# Patient Record
Sex: FEMALE | Race: AMERICAN INDIAN OR ALASKA NATIVE | NOT HISPANIC OR LATINO | Employment: UNEMPLOYED
[De-identification: names, ages, dates, MRNs, and addresses within clinical notes are randomized per-mention and may not be internally consistent; named-entity substitution may affect disease eponyms.]

---

## 2018-01-19 RX ORDER — INSULIN PUMP SYRINGE, 3 ML
EACH MISCELLANEOUS
COMMUNITY
Start: 2015-12-21

## 2018-01-19 RX ORDER — HYDROCODONE BITARTRATE AND ACETAMINOPHEN 5; 325 MG/1; MG/1
TABLET ORAL
COMMUNITY
Start: 2015-12-21 | End: 2018-05-22

## 2018-01-19 RX ORDER — NORTRIPTYLINE HCL 10 MG
CAPSULE ORAL
COMMUNITY
Start: 2015-12-21 | End: 2018-05-22

## 2018-01-19 RX ORDER — OXYBUTYNIN CHLORIDE 10 MG/1
20 TABLET, EXTENDED RELEASE ORAL DAILY
COMMUNITY
Start: 2015-12-21

## 2018-01-19 RX ORDER — GABAPENTIN 300 MG/1
CAPSULE ORAL 2 TIMES DAILY
Status: ON HOLD | COMMUNITY
Start: 2016-04-20 | End: 2018-05-23

## 2018-01-19 RX ORDER — GABAPENTIN 600 MG/1
TABLET ORAL
COMMUNITY
Start: 2015-12-21 | End: 2018-05-22 | Stop reason: DRUGHIGH

## 2018-01-19 RX ORDER — METOPROLOL TARTRATE 25 MG/1
TABLET, FILM COATED ORAL
Status: ON HOLD | COMMUNITY
Start: 2015-12-21 | End: 2018-05-23

## 2018-01-19 RX ORDER — PYRIDOXINE HCL (VITAMIN B6) 50 MG
TABLET ORAL
COMMUNITY
Start: 2015-12-21 | End: 2018-05-22

## 2018-03-24 ENCOUNTER — HEALTH MAINTENANCE LETTER (OUTPATIENT)
Age: 48
End: 2018-03-24

## 2018-05-22 ENCOUNTER — HOSPITAL ENCOUNTER (OUTPATIENT)
Facility: OTHER | Age: 48
Setting detail: OBSERVATION
Discharge: NURSING, REHAB OR LONG-TERM CARE | End: 2018-05-26
Attending: FAMILY MEDICINE | Admitting: SURGERY
Payer: COMMERCIAL

## 2018-05-22 ENCOUNTER — APPOINTMENT (OUTPATIENT)
Dept: ULTRASOUND IMAGING | Facility: OTHER | Age: 48
End: 2018-05-22
Attending: FAMILY MEDICINE
Payer: COMMERCIAL

## 2018-05-22 ENCOUNTER — HOSPITAL ENCOUNTER (EMERGENCY)
Facility: OTHER | Age: 48
Discharge: HOME | End: 2018-05-22
Attending: EMERGENCY MEDICINE | Admitting: EMERGENCY MEDICINE
Payer: COMMERCIAL

## 2018-05-22 VITALS
HEART RATE: 109 BPM | DIASTOLIC BLOOD PRESSURE: 61 MMHG | BODY MASS INDEX: 33.99 KG/M2 | HEIGHT: 61 IN | WEIGHT: 180 LBS | SYSTOLIC BLOOD PRESSURE: 103 MMHG | TEMPERATURE: 98.1 F | OXYGEN SATURATION: 98 % | RESPIRATION RATE: 18 BRPM

## 2018-05-22 DIAGNOSIS — N92.0 EXCESSIVE OR FREQUENT MENSTRUATION: ICD-10-CM

## 2018-05-22 DIAGNOSIS — D50.0 IRON DEFICIENCY ANEMIA DUE TO CHRONIC BLOOD LOSS: ICD-10-CM

## 2018-05-22 DIAGNOSIS — R42 DIZZINESS: Primary | ICD-10-CM

## 2018-05-22 DIAGNOSIS — N93.9 VAGINA BLEEDING: ICD-10-CM

## 2018-05-22 DIAGNOSIS — H53.9 VISION CHANGES: ICD-10-CM

## 2018-05-22 LAB
ALBUMIN SERPL-MCNC: 4 G/DL (ref 3.5–5.7)
ALP SERPL-CCNC: 52 U/L (ref 34–104)
ALT SERPL W P-5'-P-CCNC: 26 U/L (ref 7–52)
ANION GAP SERPL CALCULATED.3IONS-SCNC: 9 MMOL/L (ref 3–14)
AST SERPL W P-5'-P-CCNC: 21 U/L (ref 13–39)
BASOPHILS # BLD AUTO: 0 10E9/L (ref 0–0.2)
BASOPHILS # BLD AUTO: 0.1 10E9/L (ref 0–0.2)
BASOPHILS NFR BLD AUTO: 0.6 %
BASOPHILS NFR BLD AUTO: 0.7 %
BILIRUB SERPL-MCNC: 0.2 MG/DL (ref 0.3–1)
BUN SERPL-MCNC: 24 MG/DL (ref 7–25)
CALCIUM SERPL-MCNC: 9.1 MG/DL (ref 8.6–10.3)
CHLORIDE SERPL-SCNC: 110 MMOL/L (ref 98–107)
CO2 SERPL-SCNC: 24 MMOL/L (ref 21–31)
CREAT SERPL-MCNC: 0.73 MG/DL (ref 0.6–1.2)
DIFFERENTIAL METHOD BLD: ABNORMAL
DIFFERENTIAL METHOD BLD: ABNORMAL
EOSINOPHIL # BLD AUTO: 0.1 10E9/L (ref 0–0.7)
EOSINOPHIL # BLD AUTO: 0.1 10E9/L (ref 0–0.7)
EOSINOPHIL NFR BLD AUTO: 1 %
EOSINOPHIL NFR BLD AUTO: 1.8 %
ERYTHROCYTE [DISTWIDTH] IN BLOOD BY AUTOMATED COUNT: 15 % (ref 10–15)
ERYTHROCYTE [DISTWIDTH] IN BLOOD BY AUTOMATED COUNT: 15 % (ref 10–15)
GFR SERPL CREATININE-BSD FRML MDRD: 85 ML/MIN/1.7M2
GLUCOSE SERPL-MCNC: 121 MG/DL (ref 70–105)
HCG SERPL QL: NEGATIVE
HCT VFR BLD AUTO: 28.8 % (ref 35–47)
HCT VFR BLD AUTO: 31.9 % (ref 35–47)
HGB BLD-MCNC: 10.6 G/DL (ref 11.7–15.7)
HGB BLD-MCNC: 9.3 G/DL (ref 11.7–15.7)
IMM GRANULOCYTES # BLD: 0 10E9/L (ref 0–0.4)
IMM GRANULOCYTES # BLD: 0 10E9/L (ref 0–0.4)
IMM GRANULOCYTES NFR BLD: 0.1 %
IMM GRANULOCYTES NFR BLD: 0.1 %
INR PPP: 0.93 (ref 0–1.3)
LYMPHOCYTES # BLD AUTO: 2.4 10E9/L (ref 0.8–5.3)
LYMPHOCYTES # BLD AUTO: 3 10E9/L (ref 0.8–5.3)
LYMPHOCYTES NFR BLD AUTO: 35.2 %
LYMPHOCYTES NFR BLD AUTO: 44 %
MCH RBC QN AUTO: 28.2 PG (ref 26.5–33)
MCH RBC QN AUTO: 28.3 PG (ref 26.5–33)
MCHC RBC AUTO-ENTMCNC: 32.3 G/DL (ref 31.5–36.5)
MCHC RBC AUTO-ENTMCNC: 33.2 G/DL (ref 31.5–36.5)
MCV RBC AUTO: 85 FL (ref 78–100)
MCV RBC AUTO: 87 FL (ref 78–100)
MONOCYTES # BLD AUTO: 0.5 10E9/L (ref 0–1.3)
MONOCYTES # BLD AUTO: 0.5 10E9/L (ref 0–1.3)
MONOCYTES NFR BLD AUTO: 7.6 %
MONOCYTES NFR BLD AUTO: 7.9 %
NEUTROPHILS # BLD AUTO: 3.1 10E9/L (ref 1.6–8.3)
NEUTROPHILS # BLD AUTO: 3.8 10E9/L (ref 1.6–8.3)
NEUTROPHILS NFR BLD AUTO: 45.6 %
NEUTROPHILS NFR BLD AUTO: 55.4 %
PLATELET # BLD AUTO: 180 10E9/L (ref 150–450)
PLATELET # BLD AUTO: 210 10E9/L (ref 150–450)
POTASSIUM SERPL-SCNC: 4.3 MMOL/L (ref 3.5–5.1)
PROT SERPL-MCNC: 6.7 G/DL (ref 6.4–8.9)
RBC # BLD AUTO: 3.3 10E12/L (ref 3.8–5.2)
RBC # BLD AUTO: 3.74 10E12/L (ref 3.8–5.2)
SODIUM SERPL-SCNC: 143 MMOL/L (ref 134–144)
WBC # BLD AUTO: 6.7 10E9/L (ref 4–11)
WBC # BLD AUTO: 6.9 10E9/L (ref 4–11)

## 2018-05-22 PROCEDURE — 86901 BLOOD TYPING SEROLOGIC RH(D): CPT | Performed by: FAMILY MEDICINE

## 2018-05-22 PROCEDURE — 84703 CHORIONIC GONADOTROPIN ASSAY: CPT | Performed by: FAMILY MEDICINE

## 2018-05-22 PROCEDURE — 85025 COMPLETE CBC W/AUTO DIFF WBC: CPT | Performed by: EMERGENCY MEDICINE

## 2018-05-22 PROCEDURE — 80053 COMPREHEN METABOLIC PANEL: CPT | Performed by: EMERGENCY MEDICINE

## 2018-05-22 PROCEDURE — 86850 RBC ANTIBODY SCREEN: CPT | Performed by: FAMILY MEDICINE

## 2018-05-22 PROCEDURE — 36415 COLL VENOUS BLD VENIPUNCTURE: CPT | Performed by: FAMILY MEDICINE

## 2018-05-22 PROCEDURE — 86900 BLOOD TYPING SEROLOGIC ABO: CPT | Performed by: FAMILY MEDICINE

## 2018-05-22 PROCEDURE — 25000128 H RX IP 250 OP 636: Performed by: EMERGENCY MEDICINE

## 2018-05-22 PROCEDURE — 36415 COLL VENOUS BLD VENIPUNCTURE: CPT | Performed by: EMERGENCY MEDICINE

## 2018-05-22 PROCEDURE — 85610 PROTHROMBIN TIME: CPT | Performed by: FAMILY MEDICINE

## 2018-05-22 PROCEDURE — 96361 HYDRATE IV INFUSION ADD-ON: CPT | Performed by: EMERGENCY MEDICINE

## 2018-05-22 PROCEDURE — 99284 EMERGENCY DEPT VISIT MOD MDM: CPT | Mod: 25 | Performed by: EMERGENCY MEDICINE

## 2018-05-22 PROCEDURE — 96374 THER/PROPH/DIAG INJ IV PUSH: CPT | Performed by: EMERGENCY MEDICINE

## 2018-05-22 PROCEDURE — 76830 TRANSVAGINAL US NON-OB: CPT | Mod: TC

## 2018-05-22 PROCEDURE — 86920 COMPATIBILITY TEST SPIN: CPT | Performed by: FAMILY MEDICINE

## 2018-05-22 PROCEDURE — 85025 COMPLETE CBC W/AUTO DIFF WBC: CPT | Performed by: FAMILY MEDICINE

## 2018-05-22 PROCEDURE — 99284 EMERGENCY DEPT VISIT MOD MDM: CPT | Mod: Z6 | Performed by: EMERGENCY MEDICINE

## 2018-05-22 RX ORDER — KETOROLAC TROMETHAMINE 30 MG/ML
30 INJECTION, SOLUTION INTRAMUSCULAR; INTRAVENOUS ONCE
Status: COMPLETED | OUTPATIENT
Start: 2018-05-22 | End: 2018-05-22

## 2018-05-22 RX ORDER — SODIUM CHLORIDE 9 MG/ML
INJECTION, SOLUTION INTRAVENOUS CONTINUOUS
Status: DISCONTINUED | OUTPATIENT
Start: 2018-05-22 | End: 2018-05-22 | Stop reason: HOSPADM

## 2018-05-22 RX ADMIN — SODIUM CHLORIDE: 900 INJECTION, SOLUTION INTRAVENOUS at 14:03

## 2018-05-22 RX ADMIN — KETOROLAC TROMETHAMINE 30 MG: 30 INJECTION, SOLUTION INTRAMUSCULAR at 14:22

## 2018-05-22 ASSESSMENT — ENCOUNTER SYMPTOMS
SHORTNESS OF BREATH: 0
LIGHT-HEADEDNESS: 1
VOMITING: 0
CHILLS: 0
NAUSEA: 0
CHILLS: 0
LIGHT-HEADEDNESS: 1
DIFFICULTY URINATING: 1
ARTHRALGIAS: 0
EYES NEGATIVE: 1
AGITATION: 0
FATIGUE: 1
ABDOMINAL PAIN: 1
FEVER: 0
CHEST TIGHTNESS: 0
DYSURIA: 0
COUGH: 1
FEVER: 0

## 2018-05-22 NOTE — ED PROVIDER NOTES
"  History     Chief Complaint   Patient presents with     Vaginal Bleeding     Patient is a 47 year old female presenting with vaginal discharge. The history is provided by the patient.   Vaginal Problem   Associated symptoms: no dysuria, no fever, no nausea and no vomiting      My Lauren is a 47 year old female who is here complaining of vaginal bleeding. She states she has been having bleeding for the last 4 days. Last night it got much heavier and more painful. She says she has been passing large clots, one is large as her hand. She normally does not have pain with her periods but this pain is suprapubic and she rates it as a constant 7 out of 10. She has been feeling dizzy. Today she got up quickly and actually fell flat on her face. She does not believe she actually lost consciousness. Patient recently had an oophorectomy. At that time they did a D&C as well. When I asked if she has a history of fibroids she does not know that word. She has in the past been given \"hormones\" for heavy bleeding.    Problem List:    Patient Active Problem List    Diagnosis Date Noted     Trochanteric bursitis of both hips 12/27/2012     Priority: Medium     Other, mixed, or unspecified nondependent drug abuse, unspecified 07/25/2012     Priority: Medium     Overview:   Cocaine, alcohol. Completed treatment 2010.       Constipation 05/18/2012     Priority: Medium     Irregular menses 11/29/2011     Priority: Medium     Other acute reactions to stress 09/27/2011     Priority: Medium     Esophageal reflux 06/07/2011     Priority: Medium     Hypertension 06/07/2011     Priority: Medium     Insomnia 06/07/2011     Priority: Medium     Lumbago 06/07/2011     Priority: Medium     Posttraumatic stress disorder 06/07/2011     Priority: Medium     Rheumatoid arthritis (H) 06/07/2011     Priority: Medium        Past Medical History:    Past Medical History:   Diagnosis Date     Rheumatoid arthritis (H)        Past Surgical History:  " "  Past Surgical History:   Procedure Laterality Date     CHOLECYSTECTOMY      ,[facility]     LAPAROSCOPIC TUBAL LIGATION      ,[facility]     OTHER SURGICAL HISTORY      2/10,684371,OTHER,Rectal bleeding, hospitalized [facility]       Family History:    Family History   Problem Relation Age of Onset     Arthritis Mother      Arthritis,RA     DIABETES Mother      Diabetes,bordeline     CANCER Mother      Cancer,stomach tumor     Genitourinary Problems Mother      Genitourinary Disease,\"bad\" bladder     Other - See Comments Mother      hysterectomy in 30's     Other - See Comments Father           CANCER Brother      Cancer, liver CA age 55     Substance Abuse Brother      Alcohol/Drug,hx alcohol abuse     Arthritis Brother      Arthritis     Other - See Comments Brother      scoliosis     Family History Negative Sister      Good Health     Ovarian Cancer Sister 46     Cancer-ovarian     Family History Negative Sister      Good Health     DIABETES Paternal Grandmother      Diabetes     DIABETES Maternal Grandmother      Diabetes     Blood Disease Son      Blood Disease,HIV     Other - See Comments Other      Multiple incidents of suicide and death by homicide in her family.     Colon Cancer Paternal Grandfather      Cancer-colon     Breast Cancer Maternal Aunt 52     Cancer-breast     Prostate Cancer No family hx of      Cancer-prostate       Social History:  Marital Status:  Single [1]  Social History   Substance Use Topics     Smoking status: Current Every Day Smoker     Packs/day: 0.50     Years: 30.00     Types: Cigarettes     Last attempt to quit: 2012     Smokeless tobacco: Never Used     Alcohol use No      Comment: Alcoholic Drinks/day: Twice weekly        Medications:      ASPIRIN PO   Atorvastatin Calcium (LIPITOR PO)   gabapentin (NEURONTIN) 300 MG capsule   metFORMIN (GLUCOPHAGE) 500 MG tablet   metoprolol tartrate (LOPRESSOR) 25 MG tablet   oxybutynin (DITROPAN-XL) 10 MG 24 hr " "tablet   RaNITidine HCl (ZANTAC PO)   Blood Glucose Monitoring Suppl (FIFTY50 GLUCOSE METER 2.0) W/DEVICE KIT   [DISCONTINUED] gabapentin (NEURONTIN) 600 MG tablet         Review of Systems   Constitutional: Negative for chills and fever.   HENT: Negative for congestion.    Eyes: Negative for visual disturbance.   Respiratory: Negative for chest tightness and shortness of breath.    Cardiovascular: Negative for chest pain.   Gastrointestinal: Negative for nausea and vomiting.   Genitourinary: Positive for pelvic pain and vaginal bleeding. Negative for dysuria.   Musculoskeletal: Negative for arthralgias.   Skin: Negative for rash.   Neurological: Positive for light-headedness.   Psychiatric/Behavioral: Negative for agitation.       Physical Exam   BP: 134/81  Pulse: 109  Temp: 98.1  F (36.7  C)  Resp: 18  Height: 154.9 cm (5' 1\")  Weight: 81.6 kg (180 lb)  SpO2: 97 %  Lying Orthostatic BP: 128/74  Lying Orthostatic Pulse: 100 bpm  Sitting Orthostatic BP: 123/83  Sitting Orthostatic Pulse: 106 bpm  Standing Orthostatic BP: 124/91  Standing Orthostatic Pulse: 117 bpm      Physical Exam   Constitutional: She appears well-developed and well-nourished. No distress.   HENT:   Head: Normocephalic and atraumatic.   Eyes: Conjunctivae are normal.   Neck: Neck supple.   Cardiovascular: Normal rate, regular rhythm and normal heart sounds.    Pulmonary/Chest: Effort normal and breath sounds normal.   Abdominal: Soft. Bowel sounds are normal. She exhibits no distension. There is tenderness in the suprapubic area.   Diffusely tender everywhere, but much more so in the suprapubic area.   Skin: Skin is warm and dry. She is not diaphoretic.   Psychiatric: She has a normal mood and affect. Her behavior is normal.   Nursing note and vitals reviewed.      ED Course     ED Course     Procedures           Results for orders placed or performed during the hospital encounter of 05/22/18 (from the past 24 hour(s))   CBC with platelets " differential   Result Value Ref Range    WBC 6.9 4.0 - 11.0 10e9/L    RBC Count 3.74 (L) 3.8 - 5.2 10e12/L    Hemoglobin 10.6 (L) 11.7 - 15.7 g/dL    Hematocrit 31.9 (L) 35.0 - 47.0 %    MCV 85 78 - 100 fl    MCH 28.3 26.5 - 33.0 pg    MCHC 33.2 31.5 - 36.5 g/dL    RDW 15.0 10.0 - 15.0 %    Platelet Count 210 150 - 450 10e9/L    Diff Method Automated Method     % Neutrophils 55.4 %    % Lymphocytes 35.2 %    % Monocytes 7.6 %    % Eosinophils 1.0 %    % Basophils 0.7 %    % Immature Granulocytes 0.1 %    Absolute Neutrophil 3.8 1.6 - 8.3 10e9/L    Absolute Lymphocytes 2.4 0.8 - 5.3 10e9/L    Absolute Monocytes 0.5 0.0 - 1.3 10e9/L    Absolute Eosinophils 0.1 0.0 - 0.7 10e9/L    Absolute Basophils 0.1 0.0 - 0.2 10e9/L    Abs Immature Granulocytes 0.0 0 - 0.4 10e9/L   Comprehensive metabolic panel   Result Value Ref Range    Sodium 143 134 - 144 mmol/L    Potassium 4.3 3.5 - 5.1 mmol/L    Chloride 110 (H) 98 - 107 mmol/L    Carbon Dioxide 24 21 - 31 mmol/L    Anion Gap 9 3 - 14 mmol/L    Glucose 121 (H) 70 - 105 mg/dL    Urea Nitrogen 24 7 - 25 mg/dL    Creatinine 0.73 0.60 - 1.20 mg/dL    GFR Estimate 85 >60 mL/min/1.7m2    GFR Estimate If Black >90 >60 mL/min/1.7m2    Calcium 9.1 8.6 - 10.3 mg/dL    Bilirubin Total 0.2 (L) 0.3 - 1.0 mg/dL    Albumin 4.0 3.5 - 5.7 g/dL    Protein Total 6.7 6.4 - 8.9 g/dL    Alkaline Phosphatase 52 34 - 104 U/L    ALT 26 7 - 52 U/L    AST 21 13 - 39 U/L       Medications   sodium chloride 0.9% infusion ( Intravenous Stopped 5/22/18 8686)   ketorolac (TORADOL) injection 30 mg (30 mg Intravenous Given 5/22/18 1422)       Assessments & Plan (with Medical Decision Making)     I have reviewed the nursing notes.    I have reviewed the findings, diagnosis, plan and need for follow up with the patient.  Patient's labs are reassuring. After a liter of fluids her orthostatic vitals were improved. Her heart rate had gone up to 128 with standing initially, much better at this time. Resting heart  rate is in the 80s. She is feeling better as well. She also feels that the bleeding has slowed down significantly. I think we are safe to let her go home at this time, however if her bleeding returns she can always come back for a recheck. I also suggested following up with her OB/GYN to discuss the unusually heavy bleeding.    New Prescriptions    No medications on file       Final diagnoses:   Vagina bleeding       5/22/2018   [facility]     Bishnu Sanchez MD  05/22/18 0867

## 2018-05-22 NOTE — ED TRIAGE NOTES
Bleeding started 4 days ago. Last night had what felt like a after birth come out.  Patient states that it was a huge clot.  Still having clots, felt dizzy and near syncope this am. Partial hysterectomy done at [facility] on 02/04/18. Uterus, left ovary and tube is still in place. Has soaked 22 pads since yesterday night.  Patient brought back to room to be evaluated

## 2018-05-22 NOTE — ED NOTES
COLUMBIA-SUICIDE SEVERITY RATING SCALE   Screen with Triage Points for Emergency Department      Ask questions that are bolded and underlined.   Past  month   Ask Questions 1 and 2 YES NO   1)  Have you wished you were dead or wished you could go to sleep and not wake up?   x   2)  Have you actually had any thoughts of killing yourself?   x   If YES to 2, ask questions 3, 4, 5, and 6.  If NO to 2, go directly to question 6.   3)  Have you been thinking about how you might do this?   E.g.  I thought about taking an overdose but I never made a specific plan as to when where or how I would actually do it .and I would never go through with it.       4)  Have you had these thoughts and had some intention of acting on them?   As opposed to  I have the thoughts but I definitely will not do anything about them.       5)  Have you started to work out or worked out the details of how to kill yourself? Do you intend to carry out this plan?      6)  Have you ever done anything, started to do anything, or prepared to do anything to end your life?  Examples: Collected pills, obtained a gun, gave away valuables, wrote a will or suicide note, took out pills but didn t swallow any, held a gun but changed your mind or it was grabbed from your hand, went to the roof but didn t jump; or actually took pills, tried to shoot yourself, cut yourself, tried to hang yourself, etc.    If YES, ask: Was this within the past three months?  Lifetime     x    Past 3 Months        Item 1:  Behavioral Health Referral at Discharge  Item 2:  Behavioral Health Referral at Discharge   Item 3:  Behavioral Health Consult (Psychiatric Nurse/) and consider Patient Safety Precautions  Item 4:  Immediate Notification of Physician and/or Behavioral Health and Patient Safety Precautions   Item 5:  Immediate Notification of Physician and/or Behavioral Health and Patient Safety Precautions  Item 6:  Over 3 months ago: Behavioral Health Consult  (Psychiatric Nurse/) and consider Patient Safety Precautions  OR  Item 6:  3 months ago or less: Immediate Notification of Physician and/or Behavioral Health and Patient Safety Precautions

## 2018-05-22 NOTE — ED AVS SNAPSHOT
[facility]    [address]    [place] [address]    Phone:  [phone]    Fax:  [phone]                                       My Lauren   MRN: [id number]    Department:  [facility]   Date of Visit:  5/22/2018           After Visit Summary Signature Page     I have received my discharge instructions, and my questions have been answered. I have discussed any challenges I see with this plan with the nurse or doctor.    ..........................................................................................................................................  Patient/Patient Representative Signature      ..........................................................................................................................................  Patient Representative Print Name and Relationship to Patient    ..................................................               ................................................  Date                                            Time    ..........................................................................................................................................  Reviewed by Signature/Title    ...................................................              ..............................................  Date                                                            Time

## 2018-05-22 NOTE — IP AVS SNAPSHOT
MRN:[id number]                      After Visit Summary   5/22/2018    My Lauren    MRN: [id number]           Thank you!     Thank you for choosing [facility] for your care. Our goal is always to provide you with excellent care. Hearing back from our patients is one way we can continue to improve our services. Please take a few minutes to complete the written survey that you may receive in the mail after you visit with us. Thank you!        Patient Information     Date Of Birth          1970        Designated Caregiver       Most Recent Value    Caregiver    Will someone help with your care after discharge? no      About your hospital stay     You were admitted on:  May 23, 2018 You last received care in the:  [facility]    You were discharged on:  May 26, 2018       Who to Call     For medical emergencies, please call 911.  For non-urgent questions about your medical care, please call your primary care provider or clinic, [phone]          Attending Provider     Provider Specialty    [facility]    Ced Jansen MD [facility]       Primary Care Provider Office Phone # Fax #    Reji Correa [phone]      Your next 10 appointments already scheduled     Jun 01, 2018  1:15 PM CDT   [facility]   [facility]    [address]  [place] [address] [phone]           Bring a current list of meds and any records pertaining to this visit. For Physicals, please bring immunization records and any forms needing to be filled out. Please arrive 10 minutes early to complete paperwork.            Jun 05, 2018  9:45 AM CDT   [facility] Visit with Analy Robledo MD   [facility]    [address]  [place] [address]  "  [phone]           Bring a current list of meds and any records pertaining to this visit. For Physicals, please bring immunization records and any forms needing to be filled out. Please arrive 10 minutes early to complete paperwork.              Pending Results     Date and Time Order Name Status Description    5/26/2018 1334 *UA reflex to Microscopic In process     5/26/2018 1155 Urine Culture Aerobic Bacterial In process             Statement of Approval     Ordered          05/26/18 1213  I have reviewed and agree with all the recommendations and orders detailed in this document.  EFFECTIVE NOW     Approved and electronically signed by:  Ced Jansen MD             Admission Information     Date & Time Provider Department Dept. Phone    5/22/2018 Ced Jansen MD [facility] [phone]      Your Vitals Were     Blood Pressure Temperature Respirations Height Weight Pulse Oximetry    114/79 97  F (36.1  C) (Oral) 16 1.549 m (5' 1\") 86.8 kg (191 lb 5.8 oz) 97%    BMI (Body Mass Index)                   36.16 kg/m2           Care EveryWhere ID     This is your Care EveryWhere ID. This could be used by other organizations to access your [facility] medical records  [id number]        Equal Access to Services     [facility] WHIT AH: Hadii davis ho Soholli, waaxda luqadaha, qaybta kaalmada adeegyada, momo downs. So [facility] [phone].    ATENCIÓN: Si habla español, tiene a clarke disposición servicios gratuitos de asistencia lingüística. Levon al [phone].    We comply with applicable federal civil rights laws and [place] laws. We do not discriminate on the basis of race, color, national origin, age, disability, sex, sexual orientation, or gender identity.               Review of your medicines      UNREVIEWED medicines. Ask your doctor about these medicines        Dose / Directions    aspirin 81 MG tablet        Dose:  81 mg   Take 81 mg by mouth daily "   Refills:  0       atorvastatin 40 MG tablet   Commonly known as:  LIPITOR        Dose:  40 mg   Take 40 mg by mouth daily   Refills:  0       gabapentin 600 MG tablet   Commonly known as:  NEURONTIN        Dose:  600 mg   Take 600 mg by mouth 2 times daily   Refills:  0       metFORMIN 500 MG tablet   Commonly known as:  GLUCOPHAGE        Take 1 tablet by mouth 2 times daily with meals.   Refills:  0       metoprolol succinate 25 MG 24 hr tablet   Commonly known as:  TOPROL-XL        Dose:  25 mg   Take 25 mg by mouth daily   Refills:  0       oxybutynin 10 MG 24 hr tablet   Commonly known as:  DITROPAN-XL        Take 1 tablet by mouth once daily.   Refills:  0       ranitidine 150 MG tablet   Commonly known as:  ZANTAC        Dose:  150 mg   Take 150 mg by mouth daily   Refills:  0         START taking        Dose / Directions    medroxyPROGESTERone 10 MG tablet   Commonly known as:  PROVERA        Dose:  10 mg   Take 1 tablet (10 mg) by mouth daily   Quantity:  14 tablet   Refills:  0         CONTINUE these medicines which have NOT CHANGED        Dose / Directions    FIFTY50 GLUCOSE METER 2.0 w/Device Kit        Dispense meter, test strips, lancets covered by pt ins. .00   Refills:  0            Where to get your medicines      These medications were sent to [facility] [address] [place], [place] - [address] [facility] [address]     Phone:  [phone]     medroxyPROGESTERone 10 MG tablet                Protect others around you: Learn how to safely use, store and throw away your medicines at www.disposemymeds.org.             Medication List: This is a list of all your medications and when to take them. Check marks below indicate your daily home schedule. Keep this list as a reference.      Medications           Morning Afternoon Evening Bedtime As Needed    aspirin 81 MG tablet   Take 81 mg by mouth daily                                 atorvastatin 40 MG tablet   Commonly known as:  LIPITOR   Take 40 mg by mouth daily                                FIFTY50 GLUCOSE METER 2.0 w/Device Kit   Dispense meter, test strips, lancets covered by pt ins. .00                                gabapentin 600 MG tablet   Commonly known as:  NEURONTIN   Take 600 mg by mouth 2 times daily                                medroxyPROGESTERone 10 MG tablet   Commonly known as:  PROVERA   Take 1 tablet (10 mg) by mouth daily   Last time this was given:  10 mg on 5/26/2018 11:17 AM                                metFORMIN 500 MG tablet   Commonly known as:  GLUCOPHAGE   Take 1 tablet by mouth 2 times daily with meals.   Last time this was given:  500 mg on 5/26/2018  7:38 AM                                metoprolol succinate 25 MG 24 hr tablet   Commonly known as:  TOPROL-XL   Take 25 mg by mouth daily   Last time this was given:  25 mg on 5/26/2018 11:11 AM                                oxybutynin 10 MG 24 hr tablet   Commonly known as:  DITROPAN-XL   Take 1 tablet by mouth once daily.                                ranitidine 150 MG tablet   Commonly known as:  ZANTAC   Take 150 mg by mouth daily   Last time this was given:  150 mg on 5/26/2018 11:12 AM                                          More Information        Anemia  Anemia is a condition that occurs when your body does not have enough healthy red blood cells (RBCs). RBCs are the parts of your blood that carry oxygen throughout your body. A protein called hemoglobin allows your RBCs to absorb and release oxygen. Without enough RBCs or hemoglobin, your body doesn't get enough oxygen. Symptoms of anemia may then occur.    What are the symptoms of anemia?  Some people with anemia have no symptoms. But most people have symptoms that range from mild to severe. These can include:    Tiredness (fatigue)    Weakness    Pale skin    Shortness of breath    Dizziness or fainting    Rapid heartbeat    Trouble doing  normal amounts of activity    Jaundice (yellowing of your eyes, skin, or mouth; dark urine)  What causes anemia?  Anemia can occur when your body:    Loses too much blood    Does not make enough RBCs    Destroys your RBCs at a faster rate than it can replace them    Does not make a normal amount of hemoglobin in your RBCs  These problems can occur for many reasons, including:    A condition that you are born with (congenital or inherited), such as sickle cell disease or thalassemia    Heavy bleeding for any reason, including injury, surgery, childbirth, or even heavy menstrual periods    Being low in certain nutrients, such as iron, folate, or vitamin B12, possibly from a poor diet or a condition like celiac disease or Crohn's disease    Certain chronic conditions like diabetes, arthritis, or kidney disease    Certain chronic infections like tuberculosis or HIV    Exposure to certain medicines, such as those used for chemotherapy  There are different types of anemia. Your healthcare provider can tell you more about the type of anemia you have and what may have caused it.  How is anemia diagnosed?  To diagnose anemia, your healthcare provider orders blood tests. These can include:    Complete blood cell count (CBC). This test measures the amounts of the different types of blood cells.    Blood smear. This test checks the size and shape of your blood cells. To do the test, a drop of your blood is viewed under a microscope. A stain is used to make the blood cells easier to see.    Iron studies. These tests measure the amount of iron in your blood. Your body needs iron to make hemoglobin in your RBCs.    Vitamin B12 and folate studies. These tests check for some of the components that help give RBCs a normal size and shape.    Reticulocyte count. This test measures the amount of new RBCs that your bone marrow makes.    Hemoglobin electrophoresis. This test checks for problems with your hemoglobin in RBCs.  How is anemia  treated?  Treatment for anemia is based on the type of anemia, its cause, and the severity of your symptoms. Treatments may include:    Diet changes. This involves increasing the amount of certain nutrients in your diet, such as iron, vitamin B12, or folate. Your healthcare provider may also prescribe nutrient supplements.    Medicines. Certain medicines treat the cause of your anemia. Others help build new RBCs or relieve symptoms. If a medicine is the cause of your anemia, you may need to stop or change it.    Blood transfusions. Replacing some of your blood can increase the number of healthy RBCs in your body.    Surgery. In some cases, your doctor may do surgery to treat the underlying cause of anemia. If you need surgery, your healthcare provider will explain the procedure and outline the risks and benefits for you.  What are the long-term concerns?  If you have a certain type of anemia, you can expect a full recovery after treatment. If you have other types of anemia (especially a type you're born with), you will need to manage it for life. Your doctor can tell you more.  Date Last Reviewed: 12/1/2016 2000-2017 [employer]. [address] [place] [address], [place] [address]. All rights reserved. This information is not intended as a substitute for professional medical care. Always follow your healthcare professional's instructions.

## 2018-05-22 NOTE — IP AVS SNAPSHOT
[facility]    [address]    [place] [address]    Phone:  [phone]    Fax:  [phone]                                       After Visit Summary   5/22/2018    My Lauren    MRN: [id number]           After Visit Summary Signature Page     I have received my discharge instructions, and my questions have been answered. I have discussed any challenges I see with this plan with the nurse or doctor.    ..........................................................................................................................................  Patient/Patient Representative Signature      ..........................................................................................................................................  Patient Representative Print Name and Relationship to Patient    ..................................................               ................................................  Date                                            Time    ..........................................................................................................................................  Reviewed by Signature/Title    ...................................................              ..............................................  Date                                                            Time

## 2018-05-23 PROBLEM — N92.0 EXCESSIVE OR FREQUENT MENSTRUATION: Status: ACTIVE | Noted: 2018-05-23

## 2018-05-23 LAB
BLD PROD TYP BPU: NORMAL
BLD UNIT ID BPU: 0
BLOOD PRODUCT CODE: NORMAL
BPU ID: NORMAL
HGB BLD-MCNC: 8.8 G/DL (ref 11.7–15.7)
HGB BLD-MCNC: 9.1 G/DL (ref 11.7–15.7)
TRANSFUSION STATUS PATIENT QL: NORMAL
TRANSFUSION STATUS PATIENT QL: NORMAL

## 2018-05-23 PROCEDURE — 36430 TRANSFUSION BLD/BLD COMPNT: CPT

## 2018-05-23 PROCEDURE — 85018 HEMOGLOBIN: CPT | Mod: 91 | Performed by: OBSTETRICS & GYNECOLOGY

## 2018-05-23 PROCEDURE — 99218 ZZC INITIAL OBSERVATION CARE,LEVL I: CPT | Performed by: OBSTETRICS & GYNECOLOGY

## 2018-05-23 PROCEDURE — 25000125 ZZHC RX 250: Performed by: FAMILY MEDICINE

## 2018-05-23 PROCEDURE — P9016 RBC LEUKOCYTES REDUCED: HCPCS | Performed by: FAMILY MEDICINE

## 2018-05-23 PROCEDURE — 96374 THER/PROPH/DIAG INJ IV PUSH: CPT | Performed by: FAMILY MEDICINE

## 2018-05-23 PROCEDURE — G0378 HOSPITAL OBSERVATION PER HR: HCPCS

## 2018-05-23 PROCEDURE — 25000132 ZZH RX MED GY IP 250 OP 250 PS 637: Performed by: FAMILY MEDICINE

## 2018-05-23 PROCEDURE — 93005 ELECTROCARDIOGRAM TRACING: CPT | Performed by: FAMILY MEDICINE

## 2018-05-23 PROCEDURE — 25000132 ZZH RX MED GY IP 250 OP 250 PS 637: Performed by: OBSTETRICS & GYNECOLOGY

## 2018-05-23 PROCEDURE — 25000128 H RX IP 250 OP 636: Performed by: FAMILY MEDICINE

## 2018-05-23 PROCEDURE — 93010 ELECTROCARDIOGRAM REPORT: CPT | Performed by: INTERNAL MEDICINE

## 2018-05-23 PROCEDURE — 85018 HEMOGLOBIN: CPT | Performed by: FAMILY MEDICINE

## 2018-05-23 PROCEDURE — 99285 EMERGENCY DEPT VISIT HI MDM: CPT | Mod: 25 | Performed by: FAMILY MEDICINE

## 2018-05-23 PROCEDURE — 36415 COLL VENOUS BLD VENIPUNCTURE: CPT | Performed by: OBSTETRICS & GYNECOLOGY

## 2018-05-23 PROCEDURE — 36415 COLL VENOUS BLD VENIPUNCTURE: CPT | Performed by: FAMILY MEDICINE

## 2018-05-23 PROCEDURE — 99285 EMERGENCY DEPT VISIT HI MDM: CPT | Mod: Z6 | Performed by: FAMILY MEDICINE

## 2018-05-23 RX ORDER — NALOXONE HYDROCHLORIDE 0.4 MG/ML
.1-.4 INJECTION, SOLUTION INTRAMUSCULAR; INTRAVENOUS; SUBCUTANEOUS
Status: DISCONTINUED | OUTPATIENT
Start: 2018-05-23 | End: 2018-05-26 | Stop reason: HOSPADM

## 2018-05-23 RX ORDER — METOPROLOL SUCCINATE 25 MG/1
25 TABLET, EXTENDED RELEASE ORAL DAILY
Status: DISCONTINUED | OUTPATIENT
Start: 2018-05-23 | End: 2018-05-26 | Stop reason: HOSPADM

## 2018-05-23 RX ORDER — GABAPENTIN 600 MG/1
600 TABLET ORAL 2 TIMES DAILY
Status: ON HOLD | COMMUNITY
Start: 2018-05-21 | End: 2020-12-17

## 2018-05-23 RX ORDER — ALUMINA, MAGNESIA, AND SIMETHICONE 2400; 2400; 240 MG/30ML; MG/30ML; MG/30ML
15 SUSPENSION ORAL ONCE
Status: COMPLETED | OUTPATIENT
Start: 2018-05-23 | End: 2018-05-23

## 2018-05-23 RX ORDER — FERROUS SULFATE 325(65) MG
325 TABLET, DELAYED RELEASE (ENTERIC COATED) ORAL DAILY
Status: DISCONTINUED | OUTPATIENT
Start: 2018-05-23 | End: 2018-05-26 | Stop reason: HOSPADM

## 2018-05-23 RX ORDER — PROCHLORPERAZINE MALEATE 10 MG
10 TABLET ORAL EVERY 6 HOURS PRN
Status: DISCONTINUED | OUTPATIENT
Start: 2018-05-23 | End: 2018-05-26 | Stop reason: HOSPADM

## 2018-05-23 RX ORDER — LIDOCAINE 40 MG/G
CREAM TOPICAL
Status: DISCONTINUED | OUTPATIENT
Start: 2018-05-23 | End: 2018-05-26 | Stop reason: HOSPADM

## 2018-05-23 RX ORDER — GABAPENTIN 300 MG/1
600 CAPSULE ORAL 3 TIMES DAILY
Status: DISCONTINUED | OUTPATIENT
Start: 2018-05-23 | End: 2018-05-26 | Stop reason: HOSPADM

## 2018-05-23 RX ORDER — ATORVASTATIN CALCIUM 40 MG/1
40 TABLET, FILM COATED ORAL DAILY
COMMUNITY
Start: 2018-01-26

## 2018-05-23 RX ORDER — ONDANSETRON 2 MG/ML
4 INJECTION INTRAMUSCULAR; INTRAVENOUS EVERY 6 HOURS PRN
Status: DISCONTINUED | OUTPATIENT
Start: 2018-05-23 | End: 2018-05-26 | Stop reason: HOSPADM

## 2018-05-23 RX ORDER — METOPROLOL TARTRATE 25 MG/1
25 TABLET, FILM COATED ORAL DAILY
Status: DISCONTINUED | OUTPATIENT
Start: 2018-05-23 | End: 2018-05-23

## 2018-05-23 RX ORDER — METOPROLOL SUCCINATE 25 MG/1
25 TABLET, EXTENDED RELEASE ORAL DAILY
Status: ON HOLD | COMMUNITY
Start: 2018-01-26 | End: 2020-12-17

## 2018-05-23 RX ORDER — ACETAMINOPHEN 325 MG/1
650 TABLET ORAL EVERY 4 HOURS PRN
Status: DISCONTINUED | OUTPATIENT
Start: 2018-05-23 | End: 2018-05-26 | Stop reason: HOSPADM

## 2018-05-23 RX ORDER — MEDROXYPROGESTERONE ACETATE 2.5 MG/1
7.5 TABLET ORAL ONCE
Status: COMPLETED | OUTPATIENT
Start: 2018-05-23 | End: 2018-05-23

## 2018-05-23 RX ORDER — ALUMINA, MAGNESIA, AND SIMETHICONE 2400; 2400; 240 MG/30ML; MG/30ML; MG/30ML
30 SUSPENSION ORAL 4 TIMES DAILY PRN
Status: DISCONTINUED | OUTPATIENT
Start: 2018-05-23 | End: 2018-05-26 | Stop reason: HOSPADM

## 2018-05-23 RX ORDER — SODIUM CHLORIDE 9 MG/ML
INJECTION, SOLUTION INTRAVENOUS CONTINUOUS
Status: DISCONTINUED | OUTPATIENT
Start: 2018-05-23 | End: 2018-05-26 | Stop reason: HOSPADM

## 2018-05-23 RX ORDER — MORPHINE SULFATE 2 MG/ML
2 INJECTION, SOLUTION INTRAMUSCULAR; INTRAVENOUS ONCE
Status: COMPLETED | OUTPATIENT
Start: 2018-05-23 | End: 2018-05-23

## 2018-05-23 RX ORDER — ONDANSETRON 4 MG/1
4 TABLET, ORALLY DISINTEGRATING ORAL EVERY 6 HOURS PRN
Status: DISCONTINUED | OUTPATIENT
Start: 2018-05-23 | End: 2018-05-26 | Stop reason: HOSPADM

## 2018-05-23 RX ORDER — PROCHLORPERAZINE 25 MG
25 SUPPOSITORY, RECTAL RECTAL EVERY 12 HOURS PRN
Status: DISCONTINUED | OUTPATIENT
Start: 2018-05-23 | End: 2018-05-26 | Stop reason: HOSPADM

## 2018-05-23 RX ORDER — KETOROLAC TROMETHAMINE 15 MG/ML
15 INJECTION, SOLUTION INTRAMUSCULAR; INTRAVENOUS ONCE
Status: COMPLETED | OUTPATIENT
Start: 2018-05-23 | End: 2018-05-23

## 2018-05-23 RX ORDER — MEDROXYPROGESTERONE ACETATE 2.5 MG/1
10 TABLET ORAL DAILY
Status: DISCONTINUED | OUTPATIENT
Start: 2018-05-24 | End: 2018-05-26 | Stop reason: HOSPADM

## 2018-05-23 RX ORDER — MEDROXYPROGESTERONE ACETATE 2.5 MG/1
2.5 TABLET ORAL DAILY
Status: DISCONTINUED | OUTPATIENT
Start: 2018-05-23 | End: 2018-05-23

## 2018-05-23 RX ADMIN — SODIUM CHLORIDE: 900 INJECTION, SOLUTION INTRAVENOUS at 19:45

## 2018-05-23 RX ADMIN — METOPROLOL SUCCINATE 25 MG: 25 TABLET, EXTENDED RELEASE ORAL at 10:40

## 2018-05-23 RX ADMIN — ALUMINUM HYDROXIDE, MAGNESIUM HYDROXIDE, AND DIMETHICONE 15 ML: 400; 400; 40 SUSPENSION ORAL at 01:16

## 2018-05-23 RX ADMIN — GABAPENTIN 600 MG: 300 CAPSULE ORAL at 21:22

## 2018-05-23 RX ADMIN — MEDROXYPROGESTERONE ACETATE 7.5 MG: 2.5 TABLET ORAL at 16:01

## 2018-05-23 RX ADMIN — MEDROXYPROGESTERONE ACETATE 2.5 MG: 2.5 TABLET ORAL at 10:22

## 2018-05-23 RX ADMIN — METFORMIN HYDROCHLORIDE 500 MG: 500 TABLET, FILM COATED ORAL at 17:14

## 2018-05-23 RX ADMIN — RANITIDINE 150 MG: 150 TABLET ORAL at 21:22

## 2018-05-23 RX ADMIN — LIDOCAINE HYDROCHLORIDE 15 ML: 20 SOLUTION ORAL; TOPICAL at 01:16

## 2018-05-23 RX ADMIN — KETOROLAC TROMETHAMINE 15 MG: 15 INJECTION, SOLUTION INTRAMUSCULAR; INTRAVENOUS at 02:10

## 2018-05-23 RX ADMIN — METFORMIN HYDROCHLORIDE 500 MG: 500 TABLET, FILM COATED ORAL at 08:24

## 2018-05-23 RX ADMIN — SODIUM CHLORIDE: 900 INJECTION, SOLUTION INTRAVENOUS at 16:04

## 2018-05-23 RX ADMIN — GABAPENTIN 600 MG: 300 CAPSULE ORAL at 06:00

## 2018-05-23 RX ADMIN — RANITIDINE 150 MG: 150 TABLET ORAL at 10:23

## 2018-05-23 RX ADMIN — MORPHINE SULFATE 2 MG: 2 INJECTION, SOLUTION INTRAMUSCULAR; INTRAVENOUS at 00:42

## 2018-05-23 RX ADMIN — GABAPENTIN 600 MG: 300 CAPSULE ORAL at 14:44

## 2018-05-23 RX ADMIN — FERROUS SULFATE TAB EC 325 MG (65 MG FE EQUIVALENT) 325 MG: 325 (65 FE) TABLET DELAYED RESPONSE at 10:23

## 2018-05-23 RX ADMIN — SODIUM CHLORIDE: 900 INJECTION, SOLUTION INTRAVENOUS at 08:23

## 2018-05-23 ASSESSMENT — ACTIVITIES OF DAILY LIVING (ADL)
TRANSFERRING: 0-->INDEPENDENT
AMBULATION: 0-->INDEPENDENT
AMBULATION: 0 - INDEPENDENT
COMMUNICATION: 0 - UNDERSTANDS/COMMUNICATES WITHOUT DIFFICULTY
RETIRED_COMMUNICATION: 0-->UNDERSTANDS/COMMUNICATES WITHOUT DIFFICULTY
DRESS: 0-->INDEPENDENT
DRESS: 0 - INDEPENDENT
TRANSFERRING: 0 - INDEPENDENT
COGNITION: 0 - NO COGNITION ISSUES REPORTED
SWALLOWING: 0 - SWALLOWS FOODS/LIQUIDS WITHOUT DIFFICULTY
TOILETING: 0-->INDEPENDENT
BATHING: 0 - INDEPENDENT
FALL_HISTORY_WITHIN_LAST_SIX_MONTHS: NO
TOILETING: 0 - INDEPENDENT
RETIRED_EATING: 0-->INDEPENDENT
BATHING: 0-->INDEPENDENT
SWALLOWING: 0-->SWALLOWS FOODS/LIQUIDS WITHOUT DIFFICULTY
EATING: 0 - INDEPENDENT

## 2018-05-23 ASSESSMENT — PAIN DESCRIPTION - DESCRIPTORS: DESCRIPTORS: CRAMPING

## 2018-05-23 NOTE — ED TRIAGE NOTES
COLUMBIA-SUICIDE SEVERITY RATING SCALE   Screen with Triage Points for Emergency Department      Ask questions that are bolded and underlined.   Past  month   Ask Questions 1 and 2 YES NO   1)  Have you wished you were dead or wished you could go to sleep and not wake up?   x   2)  Have you actually had any thoughts of killing yourself?   x   If YES to 2, ask questions 3, 4, 5, and 6.  If NO to 2, go directly to question 6.   3)  Have you been thinking about how you might do this?   E.g.  I thought about taking an overdose but I never made a specific plan as to when where or how I would actually do it .and I would never go through with it.       4)  Have you had these thoughts and had some intention of acting on them?   As opposed to  I have the thoughts but I definitely will not do anything about them.       5)  Have you started to work out or worked out the details of how to kill yourself? Do you intend to carry out this plan?      6)  Have you ever done anything, started to do anything, or prepared to do anything to end your life?  Examples: Collected pills, obtained a gun, gave away valuables, wrote a will or suicide note, took out pills but didn t swallow any, held a gun but changed your mind or it was grabbed from your hand, went to the roof but didn t jump; or actually took pills, tried to shoot yourself, cut yourself, tried to hang yourself, etc.    If YES, ask: Was this within the past three months?  Lifetime     x    Past 3 Months     x   Item 1:  Behavioral Health Referral at Discharge  Item 2:  Behavioral Health Referral at Discharge   Item 3:  Behavioral Health Consult (Psychiatric Nurse/) and consider Patient Safety Precautions  Item 4:  Immediate Notification of Physician and/or Behavioral Health and Patient Safety Precautions   Item 5:  Immediate Notification of Physician and/or Behavioral Health and Patient Safety Precautions  Item 6:  Over 3 months ago: Behavioral Health Consult  (Psychiatric Nurse/) and consider Patient Safety Precautions  OR  Item 6:  3 months ago or less: Immediate Notification of Physician and/or Behavioral Health and Patient Safety Precautions

## 2018-05-23 NOTE — ED NOTES
Report given to CHELSI Melgar.  Pt transferred via cart to [facility].  Pt belongings with patient.  IV intact.

## 2018-05-23 NOTE — ED PROVIDER NOTES
History     Chief Complaint   Patient presents with     Vaginal Bleeding     HPI  My Lauren is a 47 year old female who has had irregular menses chronically and started bleeding 5/18/2018 with the first 2 days mild typical bleeding but since yesterday she has been passing large clots and going through multiple pads.  She states she went through 22 pads yesterday.  She was in the emergency department this afternoon from 1 to 4 PM for evaluation of her bleeding but her bleeding had slowed - basically stopped - during her time in the ER.  It resumed again at 8:30PM tonight, again passing large clots.  She is feeling a little more lightheaded and fatigued.      She had D&C and left salpingo-oophorectomy in early February at [facility].  She had some bleeding in April that seem fairly normal.  And then her current bleeding starting on the 18th.    Problem List:    Patient Active Problem List    Diagnosis Date Noted     Trochanteric bursitis of both hips 12/27/2012     Priority: Medium     Other, mixed, or unspecified nondependent drug abuse, unspecified 07/25/2012     Priority: Medium     Overview:   Cocaine, alcohol. Completed treatment 2010.       Constipation 05/18/2012     Priority: Medium     Irregular menses 11/29/2011     Priority: Medium     Other acute reactions to stress 09/27/2011     Priority: Medium     Esophageal reflux 06/07/2011     Priority: Medium     Hypertension 06/07/2011     Priority: Medium     Insomnia 06/07/2011     Priority: Medium     Lumbago 06/07/2011     Priority: Medium     Posttraumatic stress disorder 06/07/2011     Priority: Medium     Rheumatoid arthritis (H) 06/07/2011     Priority: Medium        Past Medical History:    Past Medical History:   Diagnosis Date     Rheumatoid arthritis (H)        Past Surgical History:    Past Surgical History:   Procedure Laterality Date     CHOLECYSTECTOMY      1999,[facility]     LAPAROSCOPIC TUBAL LIGATION      1993,[facility]     OTHER SURGICAL HISTORY      2/10,018861,OTHER,Rectal bleeding, hospitalized [facility]       Family History:    Family History   Problem Relation Age of Onset     Arthritis Mother      Arthritis,RA     DIABETES Mother      Diabetes,bordeline     CANCER Mother      Cancer,stomach tumor     Genitourinary Problems Mother      Genitourinary Disease,\"bad\" bladder     Other - See Comments Mother      hysterectomy in 30's     Other - See Comments Father           CANCER Brother      Cancer, liver CA age 55     Substance Abuse Brother      Alcohol/Drug,hx alcohol abuse     Arthritis Brother      Arthritis     Other - See Comments Brother      scoliosis     Family History Negative Sister      Good Health     Ovarian Cancer Sister 46     Cancer-ovarian     Family History Negative Sister      Good Health     DIABETES Paternal Grandmother      Diabetes     DIABETES Maternal Grandmother      Diabetes     Blood Disease Son      Blood Disease,HIV     Other - See Comments Other      Multiple incidents of suicide and death by homicide in her family.     Colon Cancer Paternal Grandfather      Cancer-colon     Breast Cancer Maternal Aunt 52     Cancer-breast     Prostate Cancer No family hx of      Cancer-prostate       Social History:  Marital Status:  Single [1]  Social History   Substance Use Topics     Smoking status: Current Every Day Smoker     Packs/day: 0.50     Years: 30.00     Types: Cigarettes     Last attempt to quit: 2012     Smokeless tobacco: Never Used     Alcohol use No      Comment: Alcoholic Drinks/day: Twice weekly        Medications:      ASPIRIN PO   Atorvastatin Calcium (LIPITOR PO)   Blood Glucose Monitoring Suppl (FIFTY50 GLUCOSE METER 2.0) W/DEVICE KIT   gabapentin (NEURONTIN) 300 MG capsule   metFORMIN (GLUCOPHAGE) 500 MG tablet   metoprolol tartrate (LOPRESSOR) 25 MG tablet   oxybutynin (DITROPAN-XL) 10 MG 24 hr tablet   RaNITidine HCl (ZANTAC PO)         Review of Systems " "  Constitutional: Positive for fatigue. Negative for chills and fever.   HENT: Negative.    Eyes: Negative.    Respiratory: Positive for cough (Smoker's cough).    Gastrointestinal: Positive for abdominal pain (Left lateral, left lower quadrant, and suprapubic).   Genitourinary: Positive for decreased urine volume, difficulty urinating, pelvic pain and vaginal bleeding. Negative for vaginal discharge.   Neurological: Positive for light-headedness.       Physical Exam   BP: 122/76  Heart Rate: 92  Temp: 97.7  F (36.5  C)  Resp: 16  Height: 154.9 cm (5' 1\")  SpO2: 99 %      Physical Exam   Constitutional: She appears well-developed and well-nourished. She appears distressed.   HENT:   Head: Normocephalic and atraumatic.   Right Ear: External ear normal.   Left Ear: External ear normal.   Nose: Nose normal.   Mouth/Throat: No oropharyngeal exudate.   Eyes: EOM are normal. Pupils are equal, round, and reactive to light. Right eye exhibits no discharge. Left eye exhibits no discharge. No scleral icterus.   Neck: Normal range of motion. Neck supple. No tracheal deviation present. No thyromegaly present.   Cardiovascular: Regular rhythm.    Borderline tachycardia.   Pulmonary/Chest: Effort normal and breath sounds normal. No respiratory distress.   Abdominal: Soft. Bowel sounds are normal. She exhibits no distension. There is tenderness. There is no rebound and no guarding.   Musculoskeletal: Normal range of motion. She exhibits no edema.   Neurological: She is alert. She exhibits normal muscle tone.   Skin: She is not diaphoretic.   Psychiatric:   Anxious.   Nursing note and vitals reviewed.      ED Course     ED Course     Procedures               EKG Interpretation:      Interpreted by Eron Rivera  Time reviewed: 1:25AM  Symptoms at time of EKG: writhing with midepigastric pain c/o.   Rhythm: normal sinus   Rate: normal at 88 bpm  Axis: normal  Ectopy: none  Conduction: normal  ST Segments/ T Waves: No ST-T wave " changes  Q Waves: none  Comparison to prior: Unchanged from 4/12/99    Clinical Impression: normal EKG          Critical Care time:  none               Results for orders placed or performed during the hospital encounter of 05/22/18 (from the past 24 hour(s))   CBC with platelets differential   Result Value Ref Range    WBC 6.9 4.0 - 11.0 10e9/L    RBC Count 3.74 (L) 3.8 - 5.2 10e12/L    Hemoglobin 10.6 (L) 11.7 - 15.7 g/dL    Hematocrit 31.9 (L) 35.0 - 47.0 %    MCV 85 78 - 100 fl    MCH 28.3 26.5 - 33.0 pg    MCHC 33.2 31.5 - 36.5 g/dL    RDW 15.0 10.0 - 15.0 %    Platelet Count 210 150 - 450 10e9/L    Diff Method Automated Method     % Neutrophils 55.4 %    % Lymphocytes 35.2 %    % Monocytes 7.6 %    % Eosinophils 1.0 %    % Basophils 0.7 %    % Immature Granulocytes 0.1 %    Absolute Neutrophil 3.8 1.6 - 8.3 10e9/L    Absolute Lymphocytes 2.4 0.8 - 5.3 10e9/L    Absolute Monocytes 0.5 0.0 - 1.3 10e9/L    Absolute Eosinophils 0.1 0.0 - 0.7 10e9/L    Absolute Basophils 0.1 0.0 - 0.2 10e9/L    Abs Immature Granulocytes 0.0 0 - 0.4 10e9/L   Comprehensive metabolic panel   Result Value Ref Range    Sodium 143 134 - 144 mmol/L    Potassium 4.3 3.5 - 5.1 mmol/L    Chloride 110 (H) 98 - 107 mmol/L    Carbon Dioxide 24 21 - 31 mmol/L    Anion Gap 9 3 - 14 mmol/L    Glucose 121 (H) 70 - 105 mg/dL    Urea Nitrogen 24 7 - 25 mg/dL    Creatinine 0.73 0.60 - 1.20 mg/dL    GFR Estimate 85 >60 mL/min/1.7m2    GFR Estimate If Black >90 >60 mL/min/1.7m2    Calcium 9.1 8.6 - 10.3 mg/dL    Bilirubin Total 0.2 (L) 0.3 - 1.0 mg/dL    Albumin 4.0 3.5 - 5.7 g/dL    Protein Total 6.7 6.4 - 8.9 g/dL    Alkaline Phosphatase 52 34 - 104 U/L    ALT 26 7 - 52 U/L    AST 21 13 - 39 U/L     Results for orders placed or performed during the hospital encounter of 05/22/18 (from the past 24 hour(s))   CBC with platelets differential   Result Value Ref Range    WBC 6.7 4.0 - 11.0 10e9/L    RBC Count 3.30 (L) 3.8 - 5.2 10e12/L    Hemoglobin 9.3  (L) 11.7 - 15.7 g/dL    Hematocrit 28.8 (L) 35.0 - 47.0 %    MCV 87 78 - 100 fl    MCH 28.2 26.5 - 33.0 pg    MCHC 32.3 31.5 - 36.5 g/dL    RDW 15.0 10.0 - 15.0 %    Platelet Count 180 150 - 450 10e9/L    Diff Method Automated Method     % Neutrophils 45.6 %    % Lymphocytes 44.0 %    % Monocytes 7.9 %    % Eosinophils 1.8 %    % Basophils 0.6 %    % Immature Granulocytes 0.1 %    Absolute Neutrophil 3.1 1.6 - 8.3 10e9/L    Absolute Lymphocytes 3.0 0.8 - 5.3 10e9/L    Absolute Monocytes 0.5 0.0 - 1.3 10e9/L    Absolute Eosinophils 0.1 0.0 - 0.7 10e9/L    Absolute Basophils 0.0 0.0 - 0.2 10e9/L    Abs Immature Granulocytes 0.0 0 - 0.4 10e9/L   ABO/Rh type and screen   Result Value Ref Range    ABO A     RH(D) Pos     Antibody Screen Neg     Test Valid Only At [facility]        Specimen Expires 05/25/2018    INR   Result Value Ref Range    INR 0.93 0 - 1.3   HCG qualitative Blood   Result Value Ref Range    HCG Qualitative Serum Negative NEG^Negative   US Pelvic Complete with Transvaginal    Narrative    EXAM:    US Pelvis Complete, Transabdominal    EXAM DATE/TIME:    Exam ordered 5/22/2018 11:09 PM    CLINICAL HISTORY:    47 years old, female; Signs and symptoms; Menstruation abnormalities;   Excessive menstruation; With irregular cycle; Prior surgery; Surgery date: 1-6   months; Surgery type: Patient states she had a left oophorectomy and d\T\c.   2/4/2018 in [facility]; Patient HX: Heavy menses for 4 days. Passing clots. ;   Additional info: Menometrorrhagia and pelvic pain.    TECHNIQUE:Color Doppler flow imaging utilized.    Real-time transabdominal pelvic ultrasound (complete) with image   documentation.      COMPARISON:    No relevant prior studies available.    FINDINGS:    Uterus/cervix:  The uterus measures 9.2 x 4.8 x 6 cm.  Multiple isoechoic   small uterine fibroids suspected largest measuring 2.1 cm.  No pathologic solid   masses or fluid collections within the heterogeneous 14 mm  endometrium.    Right ovary:  The right ovary measures 3 x 2.5 cm.  1.8 cm hypoechoic right   ovarian simple cyst.  There is normal positive RIGHT ovarian color Doppler flow   demonstrated without evidence of ovarian torsion.  No right adnexal mass.    Left ovary:  The left ovary is not seen.  No left adnexal mass.    Free fluid:  No pathologic free fluid within the pelvis.    Bladder:  Unremarkable as visualized.  Wall is normal thickness for degree of   distention.      Impression    IMPRESSION:         Small right ovarian cyst.      Thickened endometrial stripe for patient age which may represent endometrial   hyperplasia, endometrial polyp, or less likely endometrial neoplasm.      Fibroid uterus.      Remainder of findings as above.        EXAM:    US Pelvis Complete, transvaginal    EXAM DATE/TIME:    Exam ordered 5/22/2018 11:09 PM    CLINICAL HISTORY:    47 years old, female; Signs and symptoms; Menstruation abnormalities;   Excessive menstruation; With irregular cycle; Prior surgery; Surgery date: 1-6   months; Surgery type: Patient states she had a left oophorectomy and d\T\c.   2/4/2018 in [facility]; Patient HX: Heavy menses for 4 days. Passing clots. ;   Additional info: Menometrorrhagia and pelvic pain.    TECHNIQUE:Color Doppler flow imaging utilized.Transvaginal imaging performed   for better evaluation of pelvic structures.    Real-time transvaginal pelvic ultrasound (complete) with image documentation.        COMPARISON:    No relevant prior studies available.    FINDINGS:  Uterus/cervix:  The uterus measures 9.8 x 4.8 x 6 cm.  Multiple isoechoic small   uterine fibroids suspected largest measuring 2.1 cm.  No pathologic solid   masses or fluid collections within the heterogeneous 11 mm endometrium.    Right ovary:  The right ovary measures 2.2 x 2.5 cm.  1.8 cm simple   hypoechoic right ovarian cyst.  There is normal positive RIGHT ovarian color   Doppler flow demonstrated without evidence of  "ovarian torsion.  No right   adnexal mass.    Left ovary:  The left is unremarkable measuring 2.3 x 2.2 cm.  No left   adnexal mass. There is normal positive left ovarian color Doppler flow   demonstrated without evidence of ovarian torsion.      Free fluid:  No pathologic free fluid within the pelvis.    Bladder:  Unremarkable as visualized.  Wall is normal thickness for degree of   distention.    IMPRESSION:         Small right ovarian cyst.      Thickened endometrial stripe for patient age which may represent endometrial   hyperplasia, endometrial polyp, or less likely endometrial neoplasm.      Fibroid uterus.      Remainder of findings as above.    THIS DOCUMENT HAS BEEN ELECTRONICALLY SIGNED BY ALEJANDRO HERNÁNDEZ MD         Medications   ketorolac (TORADOL) injection 15 mg (not administered)   morphine (PF) injection 2 mg (2 mg Intravenous Given 5/23/18 0042)   alum & mag hydroxide-simethicone (MYLANTA ES/MAALOX  ES) suspension 15 mL (15 mLs Oral Given 5/23/18 0116)     And   lidocaine (viscous) (XYLOCAINE) 2 % solution 15 mL (15 mLs Mouth/Throat Given 5/23/18 0116)     11:58 PM I discussed patient with Dr. Gama, Hospitalist and then Dr. Talbert, General Surgery.  Dr. Talbert is accepting patient for observation and serial hemoglobins and then will discuss with GYN tomorrow.      1:24 AM Patient was given IV morphine for her pain complaints and nursing watched her for 15 minutes after injection because of some mild itching at her IV site only that didn't progress.  She was resting comfortably when nursing left her room.  She then called nursing back into her room with c/o severe mid-epigastric pain.  She began writhing and became anxious.  I came into her room to evaluate patient and report on the Radiologist reading of her US.  She c/o chest pain.  She indicated mid-epigastric/subxyphoid pain and stated her abdomen was \"hard.\"  Exam reveals a soft abdomen.  She is given a GI cocktail.  EKG is normal.  She reports her " pain is subsiding and reports that her pain is from the morphine.  We discuss options.    1:44 AM Patient had large belching - several episodes with resolution of her pain.  Will hold off on further medications.    1:57 AM patient resting comfortably and no further pain.  I suspect her pain set off her PTSD/anxiety.    Assessments & Plan (with Medical Decision Making)   47-year-old female with long history of menometrorrhagia and previous hospitalization for excessive vaginal bleeding this past January/February and status post D&C with left salpingo-oophorectomy in early February is once again passing large clots with declining hemoglobin.  She has a thickened endometrial stripe at 11 mm with large fibroids and clots present.  She will be observed overnight for serial hemoglobins.  She is also in treatment at [facility] with history of post traumatic stress disorder and likely has exacerbation with some midepigastric gas retention relieved by belching.  Patient self reports history of ulcers will continue her ranitidine but increase it to twice daily.  Her metoprolol is tartrate at just once daily and will monitor her blood pressures determine if she should be twice daily dosing.  Apparent Type 2 diabetes on orals and will watch her blood sugars.      I have reviewed the nursing notes.    I have reviewed the findings, diagnosis, plan and need for follow up with the patient.       New Prescriptions    No medications on file       Final diagnoses:   Excessive or frequent menstruation   Iron deficiency anemia due to chronic blood loss       5/22/2018   [facility] AND hospitalsEron silveira MD  05/23/18 [id number]

## 2018-05-23 NOTE — ED NOTES
Pt complaining of epigastric and chest pain.  MD in to evaluate.  EKG completed.  GI Cocktail given.

## 2018-05-23 NOTE — PROGRESS NOTES
Gyn Progress Note    S: patient is still symptomatic from anemia- dizziness when getting out of bed requiring two RN assist to the bathroom and now using a bedside commode. Still passing clots but getting smaller in size.     O:   Vitals:    05/23/18 0212 05/23/18 0845 05/23/18 1214 05/23/18 1403   BP: 107/60 116/70 116/70 109/47   BP Location: Right arm Right arm Right arm Right arm   Resp: 16 18 18 16   Temp: 96.9  F (36.1  C) 97.9  F (36.6  C) 98.1  F (36.7  C) 98.2  F (36.8  C)   TempSrc: Tympanic Tympanic Oral Oral   SpO2: 94% 98% 97% 98%   Height:         Gen: resting in bed, NAD  Resp: nonlabored  Psych: appropriate mood and affect    Component      Latest Ref Rng & Units 5/22/2018 5/23/2018 5/23/2018            4:24 AM 12:20 PM   Hemoglobin      11.7 - 15.7 g/dL 10.6 (L) 9.1 (L) 8.8 (L)     A/P: 47 year old HD#1 admitted with symptomatic anemia secondary to abnormal uterine bleeding. Discussed that her hemoglobin is stable from this morning to afternoon. Her vitals have been normal. Discussed that the usual recommendation for transfusion is not unless her hemoglobin is less than 8, however, given the severity of her symptoms it is reasonable to consider transfusion now. Discussed the risks and benefits of transfusion and patient would like to try this to see if her symptoms can improve. Will reassess tomorrow morning.    Analy Robledo MD  OB/GYN  5/23/2018 5:13 PM

## 2018-05-23 NOTE — H&P
Gynecology Consult Note  My Lauren   1970  MRN [id number]    CC: heavy vaginal bleeding    HPI: My Lauren is a 47 year old  presents with complaints of heavy vaginal bleeding. She reports a long history of irregular periods that have become heavier over the last 8 months. She has been working with Dr Archibald, gynecologist in [place], for this complaint. In February, she had a laparoscopic left salpingectomy for hydrosalpinx, hysteroscopy, D&C. She reports bleeding for a few days following the procedure that was light. She then had four days of heavy bleeding around the . She started bleeding lightly on 18 for a few days. The bleeding became heavy with passage of grapefruit sized clots on . She will have light bleeding, then pass a large clot followed by heavier bleeding, which will again taper. This is happening several times a day. She presented to the ED yesterday afternoon with concern that she was losing too much blood. Her bleeding had slowed and she was discharged. She presented again last night with the same complaint and her hemoglobin had decreased, therefore was admitted for observation. She has pelvic pressure, especially before she passes a clot, but not a significant amount of cramping. She feels lightheaded when she walks to the bathroom.    10 point ROS negative other than stated in HPI    PMH: arthritis, chronic hypertension, diabetes  PSH: laparoscopic cholecystectomy, laparoscopic left salpingectomy, hysteroscopy/D&C  Meds:     Current Facility-Administered Medications on File Prior to Encounter:  [COMPLETED] ketorolac (TORADOL) injection 30 mg   [DISCONTINUED] sodium chloride 0.9% infusion     Current Outpatient Prescriptions on File Prior to Encounter:  ASPIRIN PO Take 81 mg by mouth daily   Atorvastatin Calcium (LIPITOR PO) Take 20 mg by mouth daily   Blood Glucose Monitoring Suppl (FIFTY50 GLUCOSE METER 2.0) W/DEVICE KIT Dispense meter, test  strips, lancets covered by pt ins. .00   gabapentin (NEURONTIN) 300 MG capsule Take by mouth 2 times daily    metFORMIN (GLUCOPHAGE) 500 MG tablet Take 1 tablet by mouth 2 times daily with meals.   metoprolol tartrate (LOPRESSOR) 25 MG tablet Take 1 tablet by mouth once daily.   oxybutynin (DITROPAN-XL) 10 MG 24 hr tablet Take 1 tablet by mouth once daily.   RaNITidine HCl (ZANTAC PO) Take 150 mg by mouth daily     Allergies:      Allergies   Allergen Reactions     Codeine Unknown     Sulfa Drugs Unknown     Social History:   Social History   Substance Use Topics     Smoking status: Current Every Day Smoker     Packs/day: 0.50     Years: 30.00     Types: Cigarettes     Last attempt to quit: 8/31/2012     Smokeless tobacco: Never Used     Alcohol use No      Comment: Alcoholic Drinks/day: Twice weekly    She is working on quitting smoking, down to about 3 cigarettes per day. She has not used alcohol since October. She lives with her fiance. Recently moved from [place] to [facility].    Objective:  Vitals:    05/23/18 0033 05/23/18 0039 05/23/18 0100 05/23/18 0212   BP: 100/70 112/61 95/60 107/60   BP Location:    Right arm   Resp:    16   Temp:    96.9  F (36.1  C)   TempSrc:    Tympanic   SpO2:    94%   Height:       HR 78-92     Gen: resting in bed, NAD  Heart: RRR  Lungs: CTAB  Abd: soft, obese, nontender, no masses  : deferred    Labs/Imaging:  Results for orders placed or performed during the hospital encounter of 05/22/18 (from the past 24 hour(s))   CBC with platelets differential   Result Value Ref Range    WBC 6.7 4.0 - 11.0 10e9/L    RBC Count 3.30 (L) 3.8 - 5.2 10e12/L    Hemoglobin 9.3 (L) 11.7 - 15.7 g/dL    Hematocrit 28.8 (L) 35.0 - 47.0 %    MCV 87 78 - 100 fl    MCH 28.2 26.5 - 33.0 pg    MCHC 32.3 31.5 - 36.5 g/dL    RDW 15.0 10.0 - 15.0 %    Platelet Count 180 150 - 450 10e9/L    Diff Method Automated Method     % Neutrophils 45.6 %    % Lymphocytes 44.0 %    % Monocytes 7.9 %    %  Eosinophils 1.8 %    % Basophils 0.6 %    % Immature Granulocytes 0.1 %    Absolute Neutrophil 3.1 1.6 - 8.3 10e9/L    Absolute Lymphocytes 3.0 0.8 - 5.3 10e9/L    Absolute Monocytes 0.5 0.0 - 1.3 10e9/L    Absolute Eosinophils 0.1 0.0 - 0.7 10e9/L    Absolute Basophils 0.0 0.0 - 0.2 10e9/L    Abs Immature Granulocytes 0.0 0 - 0.4 10e9/L   ABO/Rh type and screen   Result Value Ref Range    ABO A     RH(D) Pos     Antibody Screen Neg     Test Valid Only At [facility]        Specimen Expires 05/25/2018    INR   Result Value Ref Range    INR 0.93 0 - 1.3   HCG qualitative Blood   Result Value Ref Range    HCG Qualitative Serum Negative NEG^Negative   US Pelvic Complete with Transvaginal    Narrative    EXAM:    US Pelvis Complete, Transabdominal    EXAM DATE/TIME:    Exam ordered 5/22/2018 11:09 PM    CLINICAL HISTORY:    47 years old, female; Signs and symptoms; Menstruation abnormalities;   Excessive menstruation; With irregular cycle; Prior surgery; Surgery date: 1-6   months; Surgery type: Patient states she had a left oophorectomy and d\T\c.   2/4/2018 in [facility]; Patient HX: Heavy menses for 4 days. Passing clots. ;   Additional info: Menometrorrhagia and pelvic pain.    TECHNIQUE:Color Doppler flow imaging utilized.    Real-time transabdominal pelvic ultrasound (complete) with image   documentation.      COMPARISON:    No relevant prior studies available.    FINDINGS:    Uterus/cervix:  The uterus measures 9.2 x 4.8 x 6 cm.  Multiple isoechoic   small uterine fibroids suspected largest measuring 2.1 cm.  No pathologic solid   masses or fluid collections within the heterogeneous 14 mm endometrium.    Right ovary:  The right ovary measures 3 x 2.5 cm.  1.8 cm hypoechoic right   ovarian simple cyst.  There is normal positive RIGHT ovarian color Doppler flow   demonstrated without evidence of ovarian torsion.  No right adnexal mass.    Left ovary:  The left ovary is not seen.  No left  adnexal mass.    Free fluid:  No pathologic free fluid within the pelvis.    Bladder:  Unremarkable as visualized.  Wall is normal thickness for degree of   distention.      Impression    IMPRESSION:         Small right ovarian cyst.      Thickened endometrial stripe for patient age which may represent endometrial   hyperplasia, endometrial polyp, or less likely endometrial neoplasm.      Fibroid uterus.      Remainder of findings as above.        EXAM:    US Pelvis Complete, transvaginal    EXAM DATE/TIME:    Exam ordered 5/22/2018 11:09 PM    CLINICAL HISTORY:    47 years old, female; Signs and symptoms; Menstruation abnormalities;   Excessive menstruation; With irregular cycle; Prior surgery; Surgery date: 1-6   months; Surgery type: Patient states she had a left oophorectomy and d\T\c.   2/4/2018 in [facility]; Patient HX: Heavy menses for 4 days. Passing clots. ;   Additional info: Menometrorrhagia and pelvic pain.    TECHNIQUE:Color Doppler flow imaging utilized.Transvaginal imaging performed   for better evaluation of pelvic structures.    Real-time transvaginal pelvic ultrasound (complete) with image documentation.        COMPARISON:    No relevant prior studies available.    FINDINGS:  Uterus/cervix:  The uterus measures 9.8 x 4.8 x 6 cm.  Multiple isoechoic small   uterine fibroids suspected largest measuring 2.1 cm.  No pathologic solid   masses or fluid collections within the heterogeneous 11 mm endometrium.    Right ovary:  The right ovary measures 2.2 x 2.5 cm.  1.8 cm simple   hypoechoic right ovarian cyst.  There is normal positive RIGHT ovarian color   Doppler flow demonstrated without evidence of ovarian torsion.  No right   adnexal mass.    Left ovary:  The left is unremarkable measuring 2.3 x 2.2 cm.  No left   adnexal mass. There is normal positive left ovarian color Doppler flow   demonstrated without evidence of ovarian torsion.      Free fluid:  No pathologic free fluid within the pelvis.     Bladder:  Unremarkable as visualized.  Wall is normal thickness for degree of   distention.    IMPRESSION:         Small right ovarian cyst.      Thickened endometrial stripe for patient age which may represent endometrial   hyperplasia, endometrial polyp, or less likely endometrial neoplasm.      Fibroid uterus.      Remainder of findings as above.    THIS DOCUMENT HAS BEEN ELECTRONICALLY SIGNED BY ALEJANDRO HERNÁNDEZ MD   Hemoglobin   Result Value Ref Range    Hemoglobin 9.1 (L) 11.7 - 15.7 g/dL       Assessment/Plan: My Lauren is a 47 year old  admitted for observation with heavy vaginal bleeding. Bleeding has improved this AM, no abnormal vital signs, and hemoglobin is stable. Reviewed ultrasound findings- endometrial stripe is as expected for a menstruating woman and she had recent benign endometrial sampling in February. Discussed need for immediate management to stop her current bleeding and the need for follow up to determine a long-term plan. Will start with Provera 10mg daily. She will also be started on an iron supplement. No indication for transfusion at the moment but will encourage ambulation this AM and ensure resolution of symptoms before she will be ready for discharge.   - Reassess this afternoon    Analy Robledo MD  OB/GYN  5/23/2018 8:34 AM

## 2018-05-23 NOTE — ED TRIAGE NOTES
Pt comes to the ER reporting vaginal bleeding with blood clots. Pt was seen for this earlier today and was told to come back if it got worse, and it has.

## 2018-05-23 NOTE — PROGRESS NOTES
Handoff procedure information verbally given to: CHELSI Romo she is aware patient in the restroom. Dr Rivera came bedside to review ultrasound before ended.

## 2018-05-23 NOTE — PLAN OF CARE
"[facility] ADMISSION NOTE    Patient admitted to room 316 at approximately 0200 via cart from emergency room. Patient was accompanied by transport tech.     Verbal SBAR report received from CHELSI Romo prior to patient arrival.     Patient ambulated to bed independently. Patient alert and oriented X 3. Pain is not well controlled.  Medication(s) being used: toradol. 0-10 Pain Scale: 9. Admission vital signs: Blood pressure 107/60, temperature 96.9  F (36.1  C), temperature source Tympanic, resp. rate 16, height 1.549 m (5' 1\"), SpO2 94 %, not currently breastfeeding. Patient was oriented to plan of care, call light, bed controls, tv, telephone, bathroom and visiting hours.     Risk Assessment    The following safety risks were identified during admission: fall. Yellow risk band applied: YES.     Skin Initial Assessment    This writer admitted this patient and completed a full skin assessment and Fran score in the Adult PCS flowsheet. Appropriate interventions initiated as needed.     Skin  Skin WDL: WDL    Fran Risk Assessment  Sensory Perception: 4-->no impairment  Moisture: 4-->rarely moist  Activity: 4-->walks frequently  Mobility: 4-->no limitation  Nutrition: 4-->excellent  Friction and Shear: 3-->no apparent problem  Fran Score: 23    Dominga Aguiar    "

## 2018-05-23 NOTE — ED NOTES
Pt sleeping.  About 0130, started belching and passing gas, relieving the pain in her epigastric area.  Refused Toradol.

## 2018-05-23 NOTE — PHARMACY-ADMISSION MEDICATION HISTORY
Pharmacy -- Admission Medication Reconciliation    Prior to admission (PTA) medications were reviewed and the patient's PTA medication list was updated.    Sources Consulted: FLAQUITO Garner, Patient Interview    The reliability of this Medication Reconciliation is: Reliability: Reliable    The following significant changes were made:  -Lipitor updated to 40 mg  -Gabapentin updated to 600 mg BID  -Metoprolol updated to succinate      In addition, the patient's allergies were reviewed with the patient and updated as follows:   Allergies: Codeine and Sulfa drugs    The pharmacist has reviewed with the patient that all personal medications should be removed from the building or locked in the belongings safe.  Patient shall only take medications ordered by the physician and administered by the nursing staff.       Medication barriers identified: none   Medication adherence concerns: none   Understanding of emergency medications: n/a    Parminder Abad formerly [facility], 5/23/2018,  2:22 PM

## 2018-05-23 NOTE — ED NOTES
Pt up to the BR, had approx 150ml of bloodly urine without clots in the collection hat. New pad given.

## 2018-05-23 NOTE — PROGRESS NOTES
"Patient very unsteady while ambulating in hallways and to restroom. States she is very dizzy and her eyes feel \"bouncy.\" Almost fell several times, even with staff present at side. Patient agreed to use call light when needing to get up. Will continue to monitor and intervene as needed. Leslie Chaudhary RN on 5/23/2018 at 10:36 AM    "

## 2018-05-24 LAB
ABO + RH BLD: NORMAL
ABO + RH BLD: NORMAL
BLD GP AB SCN SERPL QL: NORMAL
BLD PROD TYP BPU: NORMAL
BLD PROD TYP BPU: NORMAL
BLD UNIT ID BPU: 0
BLOOD BANK CMNT PATIENT-IMP: NORMAL
BLOOD PRODUCT CODE: NORMAL
BPU ID: NORMAL
HGB BLD-MCNC: 10.4 G/DL (ref 11.7–15.7)
HGB BLD-MCNC: 8.7 G/DL (ref 11.7–15.7)
NUM BPU REQUESTED: 2
SPECIMEN EXP DATE BLD: NORMAL
TRANSFUSION STATUS PATIENT QL: NORMAL
TRANSFUSION STATUS PATIENT QL: NORMAL

## 2018-05-24 PROCEDURE — 36415 COLL VENOUS BLD VENIPUNCTURE: CPT | Performed by: OBSTETRICS & GYNECOLOGY

## 2018-05-24 PROCEDURE — P9016 RBC LEUKOCYTES REDUCED: HCPCS | Performed by: FAMILY MEDICINE

## 2018-05-24 PROCEDURE — 85018 HEMOGLOBIN: CPT | Performed by: OBSTETRICS & GYNECOLOGY

## 2018-05-24 PROCEDURE — 25000132 ZZH RX MED GY IP 250 OP 250 PS 637: Performed by: OBSTETRICS & GYNECOLOGY

## 2018-05-24 PROCEDURE — 36430 TRANSFUSION BLD/BLD COMPNT: CPT

## 2018-05-24 PROCEDURE — 99224 ZZC SUBSEQUENT OBSERVATION CARE,LEVEL I: CPT | Performed by: OBSTETRICS & GYNECOLOGY

## 2018-05-24 PROCEDURE — G0378 HOSPITAL OBSERVATION PER HR: HCPCS

## 2018-05-24 PROCEDURE — 25000128 H RX IP 250 OP 636: Performed by: FAMILY MEDICINE

## 2018-05-24 PROCEDURE — 25000132 ZZH RX MED GY IP 250 OP 250 PS 637: Performed by: FAMILY MEDICINE

## 2018-05-24 RX ORDER — POLYETHYLENE GLYCOL 3350 17 G/17G
17 POWDER, FOR SOLUTION ORAL DAILY
Status: DISCONTINUED | OUTPATIENT
Start: 2018-05-24 | End: 2018-05-26 | Stop reason: HOSPADM

## 2018-05-24 RX ADMIN — MEDROXYPROGESTERONE ACETATE 10 MG: 2.5 TABLET ORAL at 10:43

## 2018-05-24 RX ADMIN — RANITIDINE 150 MG: 150 TABLET ORAL at 21:24

## 2018-05-24 RX ADMIN — SODIUM CHLORIDE: 900 INJECTION, SOLUTION INTRAVENOUS at 22:34

## 2018-05-24 RX ADMIN — SODIUM CHLORIDE: 900 INJECTION, SOLUTION INTRAVENOUS at 02:48

## 2018-05-24 RX ADMIN — METOPROLOL SUCCINATE 25 MG: 25 TABLET, EXTENDED RELEASE ORAL at 10:42

## 2018-05-24 RX ADMIN — GABAPENTIN 600 MG: 300 CAPSULE ORAL at 13:05

## 2018-05-24 RX ADMIN — POLYETHYLENE GLYCOL (3350) 17 G: 17 POWDER, FOR SOLUTION ORAL at 10:43

## 2018-05-24 RX ADMIN — GABAPENTIN 600 MG: 300 CAPSULE ORAL at 06:14

## 2018-05-24 RX ADMIN — METFORMIN HYDROCHLORIDE 500 MG: 500 TABLET, FILM COATED ORAL at 17:44

## 2018-05-24 RX ADMIN — GABAPENTIN 600 MG: 300 CAPSULE ORAL at 21:24

## 2018-05-24 RX ADMIN — SODIUM CHLORIDE: 900 INJECTION, SOLUTION INTRAVENOUS at 10:41

## 2018-05-24 RX ADMIN — METFORMIN HYDROCHLORIDE 500 MG: 500 TABLET, FILM COATED ORAL at 08:05

## 2018-05-24 RX ADMIN — FERROUS SULFATE TAB EC 325 MG (65 MG FE EQUIVALENT) 325 MG: 325 (65 FE) TABLET DELAYED RESPONSE at 10:42

## 2018-05-24 RX ADMIN — RANITIDINE 150 MG: 150 TABLET ORAL at 10:42

## 2018-05-24 NOTE — PROGRESS NOTES
Gyn Progress Note    S: patient reports she continues to feel lightheaded this morning. She reports feeling no different after transfusion. She is still passing small clots but overall her bleeding is slowly improving.    O:   Vitals:    05/23/18 2337 05/24/18 0246 05/24/18 0638 05/24/18 0810   BP: 104/62 112/53  130/64   BP Location: Right arm Right arm  Right arm   Resp: 18 18 18   Temp: 97.2  F (36.2  C) 97.3  F (36.3  C)  96.7  F (35.9  C)   TempSrc: Tympanic Tympanic  Tympanic   SpO2: 97% 97%  98%   Weight:   86.5 kg (190 lb 11.2 oz)    Height:        Gen: resting in bed, NAD  Resp: nonlabored  Abd: soft, nondistended, nontender    Component      Latest Ref Rng & Units 5/22/2018 5/23/2018 5/23/2018 5/24/2018            4:24 AM 12:20 PM    Hemoglobin      11.7 - 15.7 g/dL 9.3 (L) 9.1 (L) 8.8 (L) 8.7 (L)     A/P: 47 year old HD#2 admitted for symptomatic anemia secondary to AUB.  - Hgb without expected rise after 1U yesterday. Discussed risks and benefits of another unit of blood and patient desires to proceed.   - Continue provera 10mg daily  - Miralax added for constipation  - Dispo: pending improvement in symptoms    Analy Robledo MD  OB/GYN  5/24/2018 9:56 AM

## 2018-05-24 NOTE — PLAN OF CARE
Problem: Anemia (Adult)  Goal: Identify Related Risk Factors and Signs and Symptoms  Related risk factors and signs and symptoms are identified upon initiation of Human Response Clinical Practice Guideline (CPG).  Outcome: No Change     05/23/18 2337   Vital Signs   Temp 97.2  F (36.2  C)   Temp src Tympanic   Resp 18   Heart Rate 77   Pulse/Heart Rate Source Monitor   /62   BP Location Right arm   Oxygen Therapy   SpO2 97 %   O2 Device None (Room air)   Pain/Comfort   Patient Currently in Pain denies     Patient is still reporting dizziness and lightheadedness when changing positions. Bleeding still present and passing quarter size clots.

## 2018-05-24 NOTE — PROGRESS NOTES
Orthos negative but patient was dizzy with positional changes.     Lying 123/75        HR 68  Sitting 146/67      HR 68  Standing 164/85  HR 73    Leslie Chaudhary RN on 5/24/2018 at 2:59 PM

## 2018-05-24 NOTE — PROGRESS NOTES
Spoke with patient regarding options for smoking cessation.  Various pharmacologic options and behavioral techniques were reviewed.  The relative effectiveness as well as risks and benefits were reviewed with patient.  Patient is interested in: No interventions      RT Gianna on 5/24/2018 at 11:26 AM

## 2018-05-24 NOTE — PLAN OF CARE
Problem: Anemia (Adult)  Intervention: Facilitate Safe Activity  Patient states she is dizzy during position changes, education provided on slowly changing positions. Ele pad changed x3, several clots the size of a quarter or smaller noted when patient voids.

## 2018-05-25 ENCOUNTER — APPOINTMENT (OUTPATIENT)
Dept: OCCUPATIONAL THERAPY | Facility: OTHER | Age: 48
End: 2018-05-25
Attending: OBSTETRICS & GYNECOLOGY
Payer: COMMERCIAL

## 2018-05-25 ENCOUNTER — APPOINTMENT (OUTPATIENT)
Dept: PHYSICAL THERAPY | Facility: OTHER | Age: 48
End: 2018-05-25
Attending: OBSTETRICS & GYNECOLOGY
Payer: COMMERCIAL

## 2018-05-25 ENCOUNTER — APPOINTMENT (OUTPATIENT)
Dept: MRI IMAGING | Facility: OTHER | Age: 48
End: 2018-05-25
Attending: FAMILY MEDICINE
Payer: COMMERCIAL

## 2018-05-25 PROBLEM — R42 DIZZINESS: Status: ACTIVE | Noted: 2018-05-25

## 2018-05-25 PROBLEM — D50.0 IRON DEFICIENCY ANEMIA DUE TO CHRONIC BLOOD LOSS: Status: ACTIVE | Noted: 2018-05-25

## 2018-05-25 PROCEDURE — 25500064 ZZH RX 255 OP 636: Performed by: FAMILY MEDICINE

## 2018-05-25 PROCEDURE — G8978 MOBILITY CURRENT STATUS: HCPCS | Mod: GP,CJ

## 2018-05-25 PROCEDURE — 97161 PT EVAL LOW COMPLEX 20 MIN: CPT | Mod: GP

## 2018-05-25 PROCEDURE — 25000128 H RX IP 250 OP 636: Performed by: FAMILY MEDICINE

## 2018-05-25 PROCEDURE — 99224 ZZC SUBSEQUENT OBSERVATION CARE,LEVEL I: CPT | Mod: 25 | Performed by: FAMILY MEDICINE

## 2018-05-25 PROCEDURE — 25000132 ZZH RX MED GY IP 250 OP 250 PS 637: Performed by: FAMILY MEDICINE

## 2018-05-25 PROCEDURE — 97116 GAIT TRAINING THERAPY: CPT | Mod: GP,XU

## 2018-05-25 PROCEDURE — 70553 MRI BRAIN STEM W/O & W/DYE: CPT

## 2018-05-25 PROCEDURE — G0378 HOSPITAL OBSERVATION PER HR: HCPCS

## 2018-05-25 PROCEDURE — G8987 SELF CARE CURRENT STATUS: HCPCS | Mod: GO,CI | Performed by: OCCUPATIONAL THERAPIST

## 2018-05-25 PROCEDURE — G8988 SELF CARE GOAL STATUS: HCPCS | Mod: GO,CI | Performed by: OCCUPATIONAL THERAPIST

## 2018-05-25 PROCEDURE — 97530 THERAPEUTIC ACTIVITIES: CPT | Mod: GP

## 2018-05-25 PROCEDURE — 40000133 ZZH STATISTIC OT WARD VISIT: Performed by: OCCUPATIONAL THERAPIST

## 2018-05-25 PROCEDURE — A9575 INJ GADOTERATE MEGLUMI 0.1ML: HCPCS | Performed by: FAMILY MEDICINE

## 2018-05-25 PROCEDURE — 99224 ZZC SUBSEQUENT OBSERVATION CARE,LEVEL I: CPT | Performed by: OBSTETRICS & GYNECOLOGY

## 2018-05-25 PROCEDURE — 25000131 ZZH RX MED GY IP 250 OP 636 PS 637: Performed by: FAMILY MEDICINE

## 2018-05-25 PROCEDURE — 40000193 ZZH STATISTIC PT WARD VISIT

## 2018-05-25 PROCEDURE — 25000132 ZZH RX MED GY IP 250 OP 250 PS 637: Performed by: OBSTETRICS & GYNECOLOGY

## 2018-05-25 PROCEDURE — 97165 OT EVAL LOW COMPLEX 30 MIN: CPT | Mod: GO | Performed by: OCCUPATIONAL THERAPIST

## 2018-05-25 PROCEDURE — 97535 SELF CARE MNGMENT TRAINING: CPT | Mod: GO,XU | Performed by: OCCUPATIONAL THERAPIST

## 2018-05-25 PROCEDURE — 97530 THERAPEUTIC ACTIVITIES: CPT | Mod: GO | Performed by: OCCUPATIONAL THERAPIST

## 2018-05-25 PROCEDURE — G8979 MOBILITY GOAL STATUS: HCPCS | Mod: GP,CI

## 2018-05-25 RX ORDER — ALPRAZOLAM 0.5 MG
1 TABLET ORAL ONCE
Status: COMPLETED | OUTPATIENT
Start: 2018-05-25 | End: 2018-05-25

## 2018-05-25 RX ORDER — GADOTERATE MEGLUMINE 376.9 MG/ML
15 INJECTION INTRAVENOUS ONCE
Status: COMPLETED | OUTPATIENT
Start: 2018-05-25 | End: 2018-05-25

## 2018-05-25 RX ORDER — MECLIZINE HCL 12.5 MG 12.5 MG/1
25 TABLET ORAL EVERY 6 HOURS SCHEDULED
Status: DISCONTINUED | OUTPATIENT
Start: 2018-05-25 | End: 2018-05-26 | Stop reason: HOSPADM

## 2018-05-25 RX ORDER — MEDROXYPROGESTERONE ACETATE 10 MG
10 TABLET ORAL DAILY
Qty: 14 TABLET | Refills: 0 | Status: ON HOLD | OUTPATIENT
Start: 2018-05-25 | End: 2020-12-17

## 2018-05-25 RX ADMIN — ALPRAZOLAM 1 MG: 0.5 TABLET ORAL at 15:08

## 2018-05-25 RX ADMIN — FERROUS SULFATE TAB EC 325 MG (65 MG FE EQUIVALENT) 325 MG: 325 (65 FE) TABLET DELAYED RESPONSE at 10:08

## 2018-05-25 RX ADMIN — GABAPENTIN 600 MG: 300 CAPSULE ORAL at 05:08

## 2018-05-25 RX ADMIN — MECLIZINE HCL 25 MG: 12.5 TABLET ORAL at 18:06

## 2018-05-25 RX ADMIN — ACETAMINOPHEN 650 MG: 325 TABLET, FILM COATED ORAL at 08:54

## 2018-05-25 RX ADMIN — RANITIDINE 150 MG: 150 TABLET ORAL at 21:21

## 2018-05-25 RX ADMIN — SODIUM CHLORIDE 125 ML/HR: 900 INJECTION, SOLUTION INTRAVENOUS at 15:07

## 2018-05-25 RX ADMIN — METOPROLOL SUCCINATE 25 MG: 25 TABLET, EXTENDED RELEASE ORAL at 10:07

## 2018-05-25 RX ADMIN — MEDROXYPROGESTERONE ACETATE 10 MG: 2.5 TABLET ORAL at 10:17

## 2018-05-25 RX ADMIN — METFORMIN HYDROCHLORIDE 500 MG: 500 TABLET, FILM COATED ORAL at 08:54

## 2018-05-25 RX ADMIN — GABAPENTIN 600 MG: 300 CAPSULE ORAL at 21:21

## 2018-05-25 RX ADMIN — MECLIZINE HCL 25 MG: 12.5 TABLET ORAL at 23:22

## 2018-05-25 RX ADMIN — GABAPENTIN 600 MG: 300 CAPSULE ORAL at 13:14

## 2018-05-25 RX ADMIN — RANITIDINE 150 MG: 150 TABLET ORAL at 10:08

## 2018-05-25 RX ADMIN — NICOTINE 1 PATCH: 7 PATCH TRANSDERMAL at 13:04

## 2018-05-25 RX ADMIN — SODIUM CHLORIDE: 900 INJECTION, SOLUTION INTRAVENOUS at 23:26

## 2018-05-25 RX ADMIN — METFORMIN HYDROCHLORIDE 500 MG: 500 TABLET, FILM COATED ORAL at 18:06

## 2018-05-25 RX ADMIN — GADOTERATE MEGLUMINE 15 ML: 376.9 INJECTION INTRAVENOUS at 15:30

## 2018-05-25 RX ADMIN — POLYETHYLENE GLYCOL (3350) 17 G: 17 POWDER, FOR SOLUTION ORAL at 10:08

## 2018-05-25 NOTE — PLAN OF CARE
Problem: PT General Care Plan  Goal: Gait (PT)  PT Gait  Discharge Planner PT   Patient plan for discharge: return to treatment facility  Current status: experiencing decreased dynamic stability during gait activities  Barriers to return to prior living situation: weakness, decreased stability  Recommendations for discharge: possible continued PT when available  Rationale for recommendations: to promote strength, stability and safe mobility       Entered by: Jeromy Sheffield 05/25/2018 3:43 PM

## 2018-05-25 NOTE — PROGRESS NOTES
[facility]    Hospitalist Progress Note      Assessment & Plan   My Lauren is a 47 year old female who was admitted on 5/22/2018.     Active Problems:    Excessive or frequent menstruation    Assessment: Improved with Provera.    Plan: Outpatient follow-up with gynecology.    Dizziness, vision change.  Will get MRI of her brain to rule out any organic pathology.  We will also treat symptomatically with meclizine to see if that is helpful as some of her symptoms are consistent with vertigo.    Type 2 diabetes, continue current regimen.    # Pain Assessment:  Current Pain Score 5/25/2018   Patient currently in pain? denies   Pain score (0-10) 0   Pain location -   Pain descriptors -   My peoples pain level was assessed and she currently denies pain.      DVT Prophylaxis: Low Risk/Ambulatory with no VTE prophylaxis indicated  Code Status: No Order    Ced Jansen    Interval History   Patient was initially admitted for heavy menstrual bleeding which caused anemia.  She ultimately had 2 units of packed red blood cells transfused and has been started on Provera.  Her bleeding has essentially stopped and her hemoglobin has been stable.  She had dizziness preceding this it was felt that was likely due to her anemia however since her anemia has corrected her dizziness has not improved.  She states that when she stands up from a seated position she gets dizzy for anywhere from a couple of seconds to a minute or 2.  She states that generally it will go away if she gives it some time.  She also gets this feeling when she bends over.  She does not get this feeling when she turns her head side to side in bed or while seated in a chair.  She has no nausea.  She reports having her vision checked within the last 4 months.    She has a history of type 2 diabetes which has been well controlled per her report.    -Data reviewed today: I reviewed all new labs and imaging results over the last 24 hours. I  personally reviewed no images or EKG's today.    Physical Exam   Temp: 98.2  F (36.8  C) Temp src: Tympanic BP: 126/84   Heart Rate: 85 Resp: 16 SpO2: 95 % O2 Device: None (Room air)    Vitals:    05/24/18 0638 05/25/18 0516   Weight: 86.5 kg (190 lb 11.2 oz) 84.4 kg (186 lb)     Vital Signs with Ranges  Temp:  [97.6  F (36.4  C)-98.6  F (37  C)] 98.2  F (36.8  C)  Heart Rate:  [63-91] 85  Resp:  [16-18] 16  BP: (116-135)/(57-88) 126/84  SpO2:  [95 %-99 %] 95 %  I/O last 3 completed shifts:  In: 3924 [P.O.:1420; I.V.:2224]  Out: 6300 [Urine:6300]    Constitutional: In bed, non toxic.  Answers questions appropriately.  Respiratory: Clear, no wheezing  Cardiovascular: regular, bno murmurs, no carotid bruits.  GI: soft, NT  Neuro:  No nystagmus, normal EOM.  Normal CN2-12.  Normal upper and lower extremity strength.  Symmetric DTRS at the patella, achilles, biceps and triceps bilaterally.  She is very unsteady when she gets out f her bed.  Unable to perform romberg due to unsteadiness.  FInger nose finger is performed but she performs it poorly.    Medications     sodium chloride Stopped (05/25/18 0524)       ferrous sulfate  325 mg Oral Daily     gabapentin  600 mg Oral TID     medroxyPROGESTERone  10 mg Oral Daily     metFORMIN  500 mg Oral BID w/meals     metoprolol succinate  25 mg Oral Daily     nicotine  1 patch Transdermal Daily     nicotine   Transdermal Q8H     [START ON 5/26/2018] nicotine   Transdermal Daily     polyethylene glycol  17 g Oral Daily     ranitidine  150 mg Oral BID     sodium chloride (PF)  3 mL Intracatheter Q8H       Data     Recent Labs  Lab 05/24/18  1605 05/24/18  0428 05/23/18  1220  05/22/18  2225 05/22/18  1328   WBC  --   --   --   --  6.7 6.9   HGB 10.4* 8.7* 8.8*  < > 9.3* 10.6*   MCV  --   --   --   --  87 85   PLT  --   --   --   --  180 210   INR  --   --   --   --  0.93  --    NA  --   --   --   --   --  143   POTASSIUM  --   --   --   --   --  4.3   CHLORIDE  --   --   --   --    --  110*   CO2  --   --   --   --   --  24   BUN  --   --   --   --   --  24   CR  --   --   --   --   --  0.73   ANIONGAP  --   --   --   --   --  9   YESI  --   --   --   --   --  9.1   GLC  --   --   --   --   --  121*   ALBUMIN  --   --   --   --   --  4.0   PROTTOTAL  --   --   --   --   --  6.7   BILITOTAL  --   --   --   --   --  0.2*   ALKPHOS  --   --   --   --   --  52   ALT  --   --   --   --   --  26   AST  --   --   --   --   --  21   < > = values in this interval not displayed.    No results found for this or any previous visit (from the past 24 hour(s)).

## 2018-05-25 NOTE — PLAN OF CARE
Problem: Patient Care Overview  Goal: Plan of Care/Patient Progress Review  Outcome: No Change  Patient still ambulating with assist of two staff and having some vertigo when up. Patient started on Antivert this evening and had an MRI this afternoon. Using call light appropriately and asking staff for help when up and ambulating. Denied any pain this afternoon and reminded to let staff know when any pain and interventions would be provided to her as needed.

## 2018-05-25 NOTE — PROGRESS NOTES
05/25/18 1500   Quick Adds   Type of Visit Initial PT Evaluation   Living Environment   Lives With facility resident   Home Accessibility stairs to enter home   Number of Stairs to Enter Home 5   Stair Railings at Home inside, present on left side   Functional Level Prior   Ambulation 0-->independent   Transferring 0-->independent   Toileting 0-->independent   Communication 0-->understands/communicates without difficulty   Cognition 0 - no cognition issues reported   Fall history within last six months yes   Number of times patient has fallen within last six months 1   Which of the above functional risks had a recent onset or change? ambulation   General Information   Referring Physician Nitish   Patient/Family Goals Statement return to facility residence   Precautions/Limitations fall precautions   Weight-Bearing Status - LLE full weight-bearing   Weight-Bearing Status - RLE full weight-bearing   Cognitive Status Examination   Orientation orientation to person, place and time   Level of Consciousness alert   Follows Commands and Answers Questions 100% of the time   Personal Safety and Judgment impulsive   Memory intact   Pain Assessment   Patient Currently in Pain Yes, see Vital Sign flowsheet  (c/o increased hip discomfort with gait activities)   Range of Motion (ROM)   ROM Comment WFL LEs   Strength   Strength Comments WFL LEs   Bed Mobility   Bed Mobility Comments SBA for bed mobilities   Transfer Skills   Transfer Comments minimal assist to CGA of one with use of Fww   Gait   Gait Comments ambulation with minimal assist of one and use of Fww; epidsodes of decreased stability especially with changes in direction   Balance   Balance (onlly fair dynamic balance with use of Fww assisting with im)   Balance Comments difficult to assess reason for current instability with gait activites   Sensory Examination   Sensory Perception Comments appears intact to light touch in LEs   General Therapy Interventions   Planned  Therapy Interventions gait training;strengthening;transfer training   Clinical Impression   Criteria for Skilled Therapeutic Intervention yes, treatment indicated   PT Diagnosis impaired mobility   Influenced by the following impairments weakness, episodes of decreased stability   Functional limitations due to impairments safe mobility   Clinical Presentation Stable/Uncomplicated   Clinical Decision Making (Complexity) Low complexity   Therapy Frequency` daily   Predicted Duration of Therapy Intervention (days/wks) 1-2 days   Anticipated Equipment Needs at Discharge front wheeled walker   Anticipated Discharge Disposition (prior recovery facility)   Risk & Benefits of therapy have been explained Yes   Patient, Family & other staff in agreement with plan of care Yes   1x Eval Only-Outpatient/Observation Medicare G-Code   G-code Mobility: Walking & Moving Around   Mobility: Walking & Moving Around   Mobility: Current Status , Goal , Discharge -Szyi Only- Modifier the same for all G-codes CJ: 20-39% impairment   Total Evaluation Time   Total Evaluation Time (Minutes) 15

## 2018-05-25 NOTE — PLAN OF CARE
Problem: Anemia (Adult)  Goal: Symptom Improvement  Patient will demonstrate the desired outcomes by discharge/transition of care.   Outcome: No Change  Patient very unsteady on feet, unable to ambulate independently. Assist of two with gait belt and walker at times.

## 2018-05-25 NOTE — PROGRESS NOTES
05/25/18 0845   Quick Adds   Type of Visit Initial Occupational Therapy Evaluation   Living Environment   Lives With facility resident   Living Arrangements (treatment facility )   Home Accessibility stairs to enter home   Number of Stairs to Enter Home 5   Number of Stairs Within Home (12 steps down to shower )   Stair Railings at Home inside, present on left side   Functional Level Prior   Ambulation 0-->independent   Transferring 0-->independent   Toileting 0-->independent   Bathing 0-->independent   Dressing 0-->independent   Eating 0-->independent   Communication 0-->understands/communicates without difficulty   Swallowing 0-->swallows foods/liquids without difficulty   Cognition 0 - no cognition issues reported   Fall history within last six months no   Cognitive Status Examination   Orientation orientation to person, place and time   Level of Consciousness alert   Able to Follow Commands success, 3-step commands   Attention No deficits were identified   Visual Perception   Visual Perception No deficits were identified   Pain Assessment   Patient Currently in Pain No   Range of Motion (ROM)   ROM Quick Adds No deficits were identified   Coordination   Upper Extremity Coordination No deficits were identified   Mobility   Bed Mobility Bed mobility skill: Supine to sit   Bed Mobility Skill: Supine to Sit   Level of [place]: Supine/Sit independent   Transfer Skill: Bed to Chair/Chair to Bed   Level of [place]: Bed to Chair contact guard   Physical Assist/Nonphysical Assist: Bed to Chair 1 person assist  (due to unpredictable losses of balance at times )   Transfer Skill: Sit to Stand   Level of [place]: Sit/Stand stand-by assist   Physical Assist/Nonphysical Assist: Sit/Stand supervision   Tub/Shower Transfer   Tub/Shower Transfer Transfer Skill: Tub/Shower Transfers   Transfer Skill: Tub/Shower Transfer   Level of [place]: Tub/Shower contact guard   Physical Assist/Nonphysical Assist:  Tub/Shower 1 person assist  (had one LOB while turning in shower )   Bathing   Level of [place] stand-by assist   Physical Assist/Nonphysical Assist supervision;set-up required   Assistive Device (shower chair for safety )   Upper Body Dressing   Level of [place]: Dress Upper Body stand-by assist   Physical Assist/Nonphysical Assist: Dress Upper Body supervision   Lower Body Dressing   Level of [place]: Dress Lower Body stand-by assist   Physical Assist/Nonphysical Assist: Dress Lower Body supervision   Grooming   Level of [place]: Grooming stand-by assist   Physical Assist/Nonphysical Assist: Grooming supervision   Eating/Self Feeding   Level of [place]: Eating independent   Clinical Impression   Criteria for Skilled Therapeutic Interventions Met yes, treatment indicated   OT Diagnosis Excessive Menstruation    Influenced by the following impairments unsafe balance    Assessment of Occupational Performance 1-3 Performance Deficits   Identified Performance Deficits mobility for self cares    Clinical Decision Making (Complexity) Low complexity   Therapy Frequency daily   Predicted Duration of Therapy Intervention (days/wks) 1-2 days    Anticipated Equipment Needs at Discharge (shower chair and FWW )   Anticipated Discharge Disposition (back to Tx facility )   Risks and Benefits of Treatment have been explained. Yes   Patient, Family & other staff in agreement with plan of care Yes   1x Eval Only-Outpatient/Observation Medicare G-Code   G-code Self Care   Self Care   Self Care: Current Status , Goal ,  Discharge -Hxpd Only- Modifier the same for all G-codes CI: 1-19% impairment   Total Evaluation Time   Total Evaluation Time (Minutes) 15

## 2018-05-25 NOTE — PROGRESS NOTES
"Gyn Progress Note    S: Patient reports her bleeding has essentially stopped, only two small clots this morning. No pain. She continues to have dizziness with ambulation and feels like her eyes are \"bouncing around.\" She is still unable to ambulate to the bathroom without assistance. She is concerned about going back to the facility where she lives because there is no one there to assist her.    O:   Vitals:    05/25/18 0509 05/25/18 0516 05/25/18 0845 05/25/18 0957   BP: 122/69  135/88 128/70   BP Location: Right arm   Right arm   Resp: 18 16 16   Temp: 97.7  F (36.5  C)  97.6  F (36.4  C) 98.2  F (36.8  C)   TempSrc: Tympanic  Tympanic Tympanic   SpO2: 99%  96% 95%   Weight:  84.4 kg (186 lb)     Height:         Gen: resting in bed, appears comfortable and in NAD  Abd: soft, nondistended, nontender    Component      Latest Ref Rng & Units 5/23/2018 5/23/2018 5/24/2018 5/24/2018           4:24 AM 12:20 PM  4:28 AM  4:05 PM   Hemoglobin      11.7 - 15.7 g/dL 9.1 (L) 8.8 (L) 8.7 (L) 10.4 (L)       A/P: 47 year old HD#3 admitted for symptomatic anemia secondary to AUB.  Anemia, AUB: now correct and above 10. No ongoing bleeding with Provera. Discharge prescription for Provera ord'd. Outpatient f/u scheduled for 6/5/18    Dizziness: explained this is not related to her anemia as her Hgb is above 10 and she has all normal vital signs. OT and PT evaluation this morning with atypical losses of balance but no falling episodes, still requiring assistance with ambulation. Explained the need for a hospitalist consult for evaluation of other possible causes of dizziness    Miralax for constipation    At this time, there are no ongoing gynecologic needs. Discussed with Dr Jansen, who will evaluate patient and assume care.    Analy Robledo MD  OB/GYN  5/25/2018 12:17 PM    "

## 2018-05-25 NOTE — PLAN OF CARE
Problem: Anemia (Adult)  Goal: Symptom Improvement  Patient will demonstrate the desired outcomes by discharge/transition of care.   Patient was very unsteady when ambulating into the restroom with an assist of two and a walker. Patient states she still feels dizzy while standing. No blood clots noted in urine.

## 2018-05-25 NOTE — PROGRESS NOTES
:    Telephone call to Kiran Gentile to discuss discharge planning.  Spoke with staff Becka.  Inquired if they are able to accept patient back with a walker and some unsteadiness with mobility.  Becka stated that patient's bedroom is located up 5 stairs.  The shower is located in the basement (14 stairs).  Becka stated that she would need to contact clinical staff and get back with me regarding discharge planning.        CARMINA Zhang on 5/25/2018 at 12:35 PM

## 2018-05-25 NOTE — PLAN OF CARE
Problem: Anemia (Adult)  Goal: Identify Related Risk Factors and Signs and Symptoms  Related risk factors and signs and symptoms are identified upon initiation of Human Response Clinical Practice Guideline (CPG).   Outcome: Improving  Patient more steady with transfer to commode. States she is feeling a little better. HGB improved to 10.4. Samantha Ma RN on 5/24/2018 at 7:00 PM

## 2018-05-25 NOTE — SMOKING CESSATION
Spoke with patient regarding her request to be signed up for the Call it Quits program, referral form for Call it Quits, [place] [employer] was signed by MD and filled out by pt.       RT Juan J on 5/25/2018 at 1:59 PM

## 2018-05-25 NOTE — PROGRESS NOTES
:    Return call from Bceka at [facility].  She updated that they can accept patient back with a walker.  They would like to have PT practice on stairs with her while she is here in the hospital.  They are able to supervise patient when she returns but cannot physically assist her with walking.  [facility] does not have transportation available for discharge.  Per health patient's significant other can transport back to [facility].    CARMINA Zhang on 5/25/2018 at 2:14 PM

## 2018-05-25 NOTE — PLAN OF CARE
Problem: Patient Care Overview  Goal: Plan of Care/Patient Progress Review  Discharge Planner OT   Patient plan for discharge: Pt planning to return to tx facility   Current status: Pt is up moving around room with CGA with FWW for safety, has unpredictable losses of balance at times forward and backward/side, and catches self independently. Pt completed full shower seated with supervision for balance.   Barriers to return to prior living situation: decreased safety with balance   Recommendations for discharge: return to tx facility with 24 hour physical assist vs SNF due to balance concerns   Rationale for recommendations: see above.        Entered by: Carol Solorio 05/25/2018 3:10 PM

## 2018-05-26 ENCOUNTER — APPOINTMENT (OUTPATIENT)
Dept: OCCUPATIONAL THERAPY | Facility: OTHER | Age: 48
End: 2018-05-26
Payer: COMMERCIAL

## 2018-05-26 ENCOUNTER — APPOINTMENT (OUTPATIENT)
Dept: PHYSICAL THERAPY | Facility: OTHER | Age: 48
End: 2018-05-26
Payer: COMMERCIAL

## 2018-05-26 VITALS
WEIGHT: 191.36 LBS | SYSTOLIC BLOOD PRESSURE: 114 MMHG | DIASTOLIC BLOOD PRESSURE: 79 MMHG | RESPIRATION RATE: 16 BRPM | TEMPERATURE: 97 F | HEIGHT: 61 IN | OXYGEN SATURATION: 97 % | BODY MASS INDEX: 36.13 KG/M2

## 2018-05-26 PROBLEM — R30.0 DYSURIA: Status: ACTIVE | Noted: 2018-05-26

## 2018-05-26 LAB
ALBUMIN UR-MCNC: ABNORMAL MG/DL
ALBUMIN UR-MCNC: NEGATIVE MG/DL
APPEARANCE UR: ABNORMAL
APPEARANCE UR: CLEAR
BILIRUB UR QL STRIP: ABNORMAL
BILIRUB UR QL STRIP: NEGATIVE
COLOR UR AUTO: ABNORMAL
COLOR UR AUTO: YELLOW
GLUCOSE UR STRIP-MCNC: ABNORMAL MG/DL
GLUCOSE UR STRIP-MCNC: NEGATIVE MG/DL
HGB UR QL STRIP: ABNORMAL
HGB UR QL STRIP: NEGATIVE
KETONES UR STRIP-MCNC: ABNORMAL MG/DL
KETONES UR STRIP-MCNC: NEGATIVE MG/DL
LEUKOCYTE ESTERASE UR QL STRIP: ABNORMAL
LEUKOCYTE ESTERASE UR QL STRIP: NEGATIVE
NITRATE UR QL: ABNORMAL
NITRATE UR QL: NEGATIVE
PH UR STRIP: 7 PH (ref 5–7)
PH UR STRIP: ABNORMAL PH (ref 5–7)
RBC #/AREA URNS AUTO: ABNORMAL /HPF (ref 0–2)
SOURCE: ABNORMAL
SOURCE: NORMAL
SP GR UR STRIP: <1.005 (ref 1–1.03)
SP GR UR STRIP: ABNORMAL (ref 1–1.03)
UROBILINOGEN UR STRIP-ACNC: 0.2 EU/DL (ref 0.2–1)
UROBILINOGEN UR STRIP-MCNC: ABNORMAL MG/DL (ref 0–2)
WBC #/AREA URNS AUTO: ABNORMAL /HPF

## 2018-05-26 PROCEDURE — 25000131 ZZH RX MED GY IP 250 OP 636 PS 637: Performed by: FAMILY MEDICINE

## 2018-05-26 PROCEDURE — 40000133 ZZH STATISTIC OT WARD VISIT

## 2018-05-26 PROCEDURE — 40000193 ZZH STATISTIC PT WARD VISIT

## 2018-05-26 PROCEDURE — 87086 URINE CULTURE/COLONY COUNT: CPT | Performed by: FAMILY MEDICINE

## 2018-05-26 PROCEDURE — 81001 URINALYSIS AUTO W/SCOPE: CPT | Performed by: FAMILY MEDICINE

## 2018-05-26 PROCEDURE — 25000132 ZZH RX MED GY IP 250 OP 250 PS 637: Performed by: OBSTETRICS & GYNECOLOGY

## 2018-05-26 PROCEDURE — 97116 GAIT TRAINING THERAPY: CPT | Mod: GP

## 2018-05-26 PROCEDURE — 97530 THERAPEUTIC ACTIVITIES: CPT | Mod: GP

## 2018-05-26 PROCEDURE — 25000132 ZZH RX MED GY IP 250 OP 250 PS 637: Performed by: FAMILY MEDICINE

## 2018-05-26 PROCEDURE — G0378 HOSPITAL OBSERVATION PER HR: HCPCS

## 2018-05-26 PROCEDURE — 25000128 H RX IP 250 OP 636: Performed by: FAMILY MEDICINE

## 2018-05-26 PROCEDURE — 97535 SELF CARE MNGMENT TRAINING: CPT | Mod: GO,XU

## 2018-05-26 PROCEDURE — 81003 URINALYSIS AUTO W/O SCOPE: CPT | Mod: XU | Performed by: FAMILY MEDICINE

## 2018-05-26 PROCEDURE — 99217 ZZC OBSERVATION CARE DISCHARGE: CPT | Performed by: FAMILY MEDICINE

## 2018-05-26 RX ADMIN — GABAPENTIN 600 MG: 300 CAPSULE ORAL at 14:56

## 2018-05-26 RX ADMIN — NICOTINE 1 PATCH: 7 PATCH TRANSDERMAL at 11:24

## 2018-05-26 RX ADMIN — SODIUM CHLORIDE: 900 INJECTION, SOLUTION INTRAVENOUS at 07:37

## 2018-05-26 RX ADMIN — RANITIDINE 150 MG: 150 TABLET ORAL at 11:12

## 2018-05-26 RX ADMIN — METFORMIN HYDROCHLORIDE 500 MG: 500 TABLET, FILM COATED ORAL at 07:38

## 2018-05-26 RX ADMIN — MECLIZINE HCL 25 MG: 12.5 TABLET ORAL at 11:38

## 2018-05-26 RX ADMIN — POLYETHYLENE GLYCOL (3350) 17 G: 17 POWDER, FOR SOLUTION ORAL at 11:14

## 2018-05-26 RX ADMIN — GABAPENTIN 600 MG: 300 CAPSULE ORAL at 06:28

## 2018-05-26 RX ADMIN — MECLIZINE HCL 25 MG: 12.5 TABLET ORAL at 06:28

## 2018-05-26 RX ADMIN — MEDROXYPROGESTERONE ACETATE 10 MG: 2.5 TABLET ORAL at 11:17

## 2018-05-26 RX ADMIN — METOPROLOL SUCCINATE 25 MG: 25 TABLET, EXTENDED RELEASE ORAL at 11:11

## 2018-05-26 RX ADMIN — FERROUS SULFATE TAB EC 325 MG (65 MG FE EQUIVALENT) 325 MG: 325 (65 FE) TABLET DELAYED RESPONSE at 11:12

## 2018-05-26 NOTE — PHARMACY - DISCHARGE MEDICATION RECONCILIATION
Pharmacy:  Discharge Counseling and Medication Reconciliation    My Lauren  [address] [address] [address]  [phone] (home)   47 year old female  PCP: Reji Correa    Allergies: Codeine and Sulfa drugs    Discharge Counseling:    Pharmacist met with patient and spouse today to review the medication portion of the After Visit Summary (with an emphasis on NEW medications) and to address patient's questions/concerns.    Summary of Education: reviewed medroxyprogesterone verbally and in print    Materials Provided:  MedCounselor sheets printed from Clinical Pharmacology on: medroxyprogesterone    Discharge Medication Reconciliation:    Nina Rausch [facility] has reviewed the patient's discharge medication orders and has compared them to the inpatient medication administration record and to what the patient was taking prior to admission - any discrepancies have been resolved.    It has been determined that the patient has an adequate supply of medications available or which can be obtained from the patient's preferred pharmacy, which she has confirmed as: [facility]. An updated medication list will be faxed to the patient's pharmacy.    Patient asked about iron and something for dizziness. Pharmacy left note for provider to address.    Thank you for the consult.    Nina Rausch RPH........May 26, 2018 2:52 PM

## 2018-05-26 NOTE — PROGRESS NOTES
"[facility]    Hospitalist Progress Note      Assessment & Plan   My Lauren is a 47 year old female who was admitted on 5/22/2018.     Active Problems:    Excessive or frequent menstruation    Assessment: Improved with Provera.    Plan: Outpatient follow-up with gynecology.    Dizziness, vision change.  MRI performed yesterday, formal read is pending.  Still needs assistance with ambulation.  Pending MRI results will discuss discharge planning of returning to [facility] versus SNF.    Type 2 diabetes, continue current regimen.    # Pain Assessment:  Current Pain Score 5/26/2018   Patient currently in pain? denies   Pain score (0-10) -   Pain location -   Pain descriptors -   My peoples pain level was assessed and she currently denies pain.      DVT Prophylaxis: Low Risk/Ambulatory with no VTE prophylaxis indicated  Code Status: No Order    Ced Jansen    Interval History   She reports some improvement over night with dizziness.  She still feels unsteady on her feet.  She continues to feel like her \"eyes are bouncing.\"  She has had no worsening of symptoms.    -Data reviewed today: I reviewed all new labs and imaging results over the last 24 hours. I personally reviewed no images or EKG's today.    Physical Exam   Temp: 97  F (36.1  C) Temp src: Oral BP: 114/79   Heart Rate: 82 Resp: 16 SpO2: 97 % O2 Device: None (Room air)    Vitals:    05/24/18 0638 05/25/18 0516 05/26/18 0301   Weight: 86.5 kg (190 lb 11.2 oz) 84.4 kg (186 lb) 86.8 kg (191 lb 5.8 oz)     Vital Signs with Ranges  Temp:  [97  F (36.1  C)-99.2  F (37.3  C)] 97  F (36.1  C)  Heart Rate:  [77-95] 82  Resp:  [16-18] 16  BP: (102-136)/(59-84) 114/79  SpO2:  [95 %-99 %] 97 %  I/O last 3 completed shifts:  In: 3997 [P.O.:2170; I.V.:1827]  Out: 4925 [Urine:4925]    Constitutional: In chair, non toxic.  Answers questions appropriately.  Significant other is with her in her room.  Respiratory: Clear, no wheezing  Cardiovascular: " regular, bno murmurs, no carotid bruits.  GI: soft, NT  Neuro: No focal motor or sensory deficits.    Medications     sodium chloride 125 mL/hr at 05/26/18 0737       ferrous sulfate  325 mg Oral Daily     gabapentin  600 mg Oral TID     meclizine  25 mg Oral Q6H DAVID     medroxyPROGESTERone  10 mg Oral Daily     metFORMIN  500 mg Oral BID w/meals     metoprolol succinate  25 mg Oral Daily     nicotine  1 patch Transdermal Daily     nicotine   Transdermal Q8H     nicotine   Transdermal Daily     polyethylene glycol  17 g Oral Daily     ranitidine  150 mg Oral BID     sodium chloride (PF)  3 mL Intracatheter Q8H       Data     Recent Labs  Lab 05/24/18  1605 05/24/18  0428 05/23/18  1220  05/22/18  2225 05/22/18  1328   WBC  --   --   --   --  6.7 6.9   HGB 10.4* 8.7* 8.8*  < > 9.3* 10.6*   MCV  --   --   --   --  87 85   PLT  --   --   --   --  180 210   INR  --   --   --   --  0.93  --    NA  --   --   --   --   --  143   POTASSIUM  --   --   --   --   --  4.3   CHLORIDE  --   --   --   --   --  110*   CO2  --   --   --   --   --  24   BUN  --   --   --   --   --  24   CR  --   --   --   --   --  0.73   ANIONGAP  --   --   --   --   --  9   YESI  --   --   --   --   --  9.1   GLC  --   --   --   --   --  121*   ALBUMIN  --   --   --   --   --  4.0   PROTTOTAL  --   --   --   --   --  6.7   BILITOTAL  --   --   --   --   --  0.2*   ALKPHOS  --   --   --   --   --  52   ALT  --   --   --   --   --  26   AST  --   --   --   --   --  21   < > = values in this interval not displayed.    No results found for this or any previous visit (from the past 24 hour(s)).

## 2018-05-26 NOTE — PLAN OF CARE
Problem: Patient Care Overview  Goal: Plan of Care/Patient Progress Review   ~Diagnostic tests and consults completed and       resulted.   ~Vital signs at baseline.   ~Tolerating oral intake.   ~Safe discharge environment has been      identified by care management.   ~Adequate pain control on oral analgesia.   ~Ability to ambulate is at baseline.   ~Tolerating oral antibiotics without worsening      of infection.   ~02 sats at baseline or greater than 93% on           room air.    Outcome: No Change  No change since initial assessment at 1947.

## 2018-05-26 NOTE — PROGRESS NOTES
NSG DISCHARGE NOTE    Patient discharged to rehabilitation facility at 3:34 PM via ambulation. Accompanied by significant other and staff. Discharge instructions reviewed with patient, opportunity offered to ask questions. Prescriptions sent to patients preferred pharmacy. All belongings sent with patient.    Radha Guzman

## 2018-05-26 NOTE — PHARMACY - DISCHARGE MEDICATION RECONCILIATION
[facility]  Part of [facility]  [address] [place] [address]    May 26, 2018    Dear Pharmacist,    Your customer, My Lauren, born on 1970, was recently discharged from [facility].  We have updated her medication list and want to alert you to the following:       Review of your medicines      UNREVIEWED medicines. Ask your doctor about these medicines       Dose / Directions    aspirin 81 MG tablet        Dose:  81 mg   Take 81 mg by mouth daily   Refills:  0       atorvastatin 40 MG tablet   Commonly known as:  LIPITOR        Dose:  40 mg   Take 40 mg by mouth daily   Refills:  0       gabapentin 600 MG tablet   Commonly known as:  NEURONTIN        Dose:  600 mg   Take 600 mg by mouth 2 times daily   Refills:  0       metFORMIN 500 MG tablet   Commonly known as:  GLUCOPHAGE        Take 1 tablet by mouth 2 times daily with meals.   Refills:  0       metoprolol succinate 25 MG 24 hr tablet   Commonly known as:  TOPROL-XL        Dose:  25 mg   Take 25 mg by mouth daily   Refills:  0       oxybutynin 10 MG 24 hr tablet   Commonly known as:  DITROPAN-XL        Take 1 tablet by mouth once daily.   Refills:  0       ranitidine 150 MG tablet   Commonly known as:  ZANTAC        Dose:  150 mg   Take 150 mg by mouth daily   Refills:  0         START taking       Dose / Directions    medroxyPROGESTERone 10 MG tablet   Commonly known as:  PROVERA        Dose:  10 mg   Take 1 tablet (10 mg) by mouth daily   Quantity:  14 tablet   Refills:  0         CONTINUE these medicines which have NOT CHANGED       Dose / Directions    FIFTY50 GLUCOSE METER 2.0 w/Device Kit        Dispense meter, test strips, lancets covered by pt ins. .00   Refills:  0            Where to get your medicines      These medications were sent to [id number] - [place], [place] - [address], [facility] [address]      Phone:  [phone]      medroxyPROGESTERone 10 MG tablet             We also reviewed My Lauren's allergy list and updated it as needed:  Allergies: Codeine and Sulfa drugs    Thank you for continuing to care for My Lauren.  We look forward to working together with you in the future.    Sincerely,  Nina Rausch, [facility]

## 2018-05-26 NOTE — PLAN OF CARE
Problem: Patient Care Overview  Goal: Plan of Care/Patient Progress Review   ~Diagnostic tests and consults completed and       resulted.   ~Vital signs at baseline.   ~Tolerating oral intake.   ~Safe discharge environment has been      identified by care management.   ~Adequate pain control on oral analgesia.   ~Ability to ambulate is at baseline.   ~Tolerating oral antibiotics without worsening      of infection.   ~02 sats at baseline or greater than 93% on           room air.    Discharge Planner PT   Patient plan for discharge: Return to treatment facility  Current status: Continues to have decreased stability with gait and transfers with frequent unpredictable balance losses noted which at times require mod assist from staff to recover and prevent fall. Using FWW for mobility purposes.  Barriers to return to prior living situation: weakness, postural control, decreased balance  Recommendations for discharge: would benefit from continued PT to address balance/fall risk  Rationale for recommendations: to promote strength, balance/stability and safe mobility skills.       Entered by: Cecilia Daugherty 05/26/2018 10:34 AM

## 2018-05-26 NOTE — PLAN OF CARE
Problem: Patient Care Overview  Goal: Plan of Care/Patient Progress Review   ~Diagnostic tests and consults completed and       resulted.   ~Vital signs at baseline.   ~Tolerating oral intake.   ~Safe discharge environment has been      identified by care management.   ~Adequate pain control on oral analgesia.   ~Ability to ambulate is at baseline.   ~Tolerating oral antibiotics without worsening      of infection.   ~02 sats at baseline or greater than 93% on           room air.    Discharge Planner OT   Patient plan for discharge: return to [facility]  Current status: assist of 1 for all functional mobility related to balance deficits, fall risk  Barriers to return to prior living situation: balance deficits, dizziness, visual disturbances  Recommendations for discharge: assist of 1 for functional mobility related to balance deficits, fall risk  Rationale for recommendations: see above       Entered by: Rhonda Sierra 05/26/2018 10:27 AM

## 2018-05-26 NOTE — PLAN OF CARE
Problem: Patient Care Overview  Goal: Plan of Care/Patient Progress Review   ~Diagnostic tests and consults completed and       resulted.   ~Vital signs at baseline.   ~Tolerating oral intake.   ~Safe discharge environment has been      identified by care management.   ~Adequate pain control on oral analgesia.   ~Ability to ambulate is at baseline.   ~Tolerating oral antibiotics without worsening      of infection.   ~02 sats at baseline or greater than 93% on           room air.    Outcome: No Change  All VSS. Patient denies pain. Patient reports having minimal spotting throughout the night. Ambulated to the bathroom twice with assist x2. Gait is unsteady, and patient continues to report dizziness when upright/with ambulation. Patient's spouse is rooming in and is attentive to her needs.

## 2018-05-26 NOTE — PLAN OF CARE
Problem: Patient Care Overview  Goal: Plan of Care/Patient Progress Review   ~Diagnostic tests and consults completed and       resulted.   ~Vital signs at baseline.   ~Tolerating oral intake.   ~Safe discharge environment has been      identified by care management.   ~Adequate pain control on oral analgesia.   ~Ability to ambulate is at baseline.   ~Tolerating oral antibiotics without worsening      of infection.   ~02 sats at baseline or greater than 93% on           room air.   Outcome: No Change  VSS. Patient denies pain. Patient reports little to no spotting with no clots. Ambulates to bathroom with assist x2 and reports dizziness upon standing. Patient tolerating regular diet. She is resting without difficulty at this time.

## 2018-05-26 NOTE — DISCHARGE SUMMARY
"[facility]    Discharge Summary  Hospitalist    Date of Admission:  5/22/2018  Date of Discharge:  5/26/2018  Discharging Provider: Ced Jansen  Date of Service (when I saw the patient): 05/26/18    Discharge Diagnoses   Principal Problem:    Excessive or frequent menstruation (5/23/2018)  Active Problems:    Dizziness (5/25/2018)    Iron deficiency anemia due to chronic blood loss (5/25/2018)    Dysuria (5/26/2018)      History of Present Illness   My Lauren is an 47 year old female who presented with excessive vaginal bleeding.    Hospital Course   yM Lauren was admitted on 5/22/2018.  The following problems were addressed during her hospitalization:    Vaginal bleeding.  She was seen initially for excessive vaginal bleeding.  This is been going on for several months but it is worsened to the point where she was getting dizzy.  She was transfused 2 units of packed red blood cells and started on Provera.  Her bleeding has since stopped.  Her anemia has improved.    While she was in the hospital her dizziness was not resolved with improvement in her hemoglobin.  She was having some symptoms of vertigo where she was feeling dizzy especially with changing positions such as going from seated to standing position.  She was started on meclizine 4 times daily on the day prior to discharge and she is having improvement in her symptoms.  She states that her vision seems \"like things are bouncing left and right.\"  She has been seen by ophthalmology previously in the last couple of months with a normal exam.  Because of her neurologic symptoms and MRI is performed which was unremarkable.  She was seen by PT and OT who described inconsistencies in her ambulation and unpredictability.  We discussed the options of going to SNF for rehab versus returning to her treatment facility and she chose to return to her treatment facility.    She has a history of methamphetamine abuse and states " that her last use was in April.    Just prior to discharge she describe dysuria.  Will check a urinalysis before she leaves and treat if appropriate.    She has follow-up appointment scheduled with Dr. Robledo in OB/GYN and her primary physician.    # Discharge Pain Plan:   - Patient currently has NO PAIN and is not being prescribed pain medications on discharge.    Ced Jansen MD    Significant Results and Procedures   Imaging results below.    Code Status   Full Code       Primary Care Physician   Reji Correa    Physical Exam   Temp: 97  F (36.1  C) Temp src: Oral BP: 114/79   Heart Rate: 82 Resp: 16 SpO2: 97 % O2 Device: None (Room air)    Vitals:    05/24/18 0638 05/25/18 0516 05/26/18 0301   Weight: 86.5 kg (190 lb 11.2 oz) 84.4 kg (186 lb) 86.8 kg (191 lb 5.8 oz)     Vital Signs with Ranges  Temp:  [97  F (36.1  C)-99.2  F (37.3  C)] 97  F (36.1  C)  Heart Rate:  [77-95] 82  Resp:  [16-18] 16  BP: (102-136)/(59-84) 114/79  SpO2:  [95 %-99 %] 97 %  I/O last 3 completed shifts:  In: 3997 [P.O.:2170; I.V.:1827]  Out: 4925 [Urine:4925]    See todays progress note for exam.    Discharge Disposition   Discharged to [facility]  Condition at discharge: Stable    Consultations This Hospital Stay   PHARMACY IP CONSULT  SMOKING CESSATION PROGRAM IP CONSULT  PHYSICAL THERAPY ADULT IP CONSULT  OCCUPATIONAL THERAPY ADULT IP CONSULT  HOSPITALIST IP CONSULT  SOCIAL WORK IP CONSULT  SMOKING CESSATION PROGRAM IP CONSULT    Time Spent on this Encounter   ICed, personally saw the patient today and spent greater than 30 minutes discharging this patient.    Discharge Orders   No discharge procedures on file.  Discharge Medications   Current Discharge Medication List      START taking these medications    Details   medroxyPROGESTERone (PROVERA) 10 MG tablet Take 1 tablet (10 mg) by mouth daily  Qty: 14 tablet, Refills: 0    Associated Diagnoses: Excessive or frequent menstruation         CONTINUE these  medications which have NOT CHANGED    Details   aspirin 81 MG tablet Take 81 mg by mouth daily      atorvastatin (LIPITOR) 40 MG tablet Take 40 mg by mouth daily      Blood Glucose Monitoring Suppl (FIFTY50 GLUCOSE METER 2.0) W/DEVICE KIT Dispense meter, test strips, lancets covered by pt ins. .00      gabapentin (NEURONTIN) 600 MG tablet Take 600 mg by mouth 2 times daily      metFORMIN (GLUCOPHAGE) 500 MG tablet Take 1 tablet by mouth 2 times daily with meals.      metoprolol succinate (TOPROL-XL) 25 MG 24 hr tablet Take 25 mg by mouth daily      oxybutynin (DITROPAN-XL) 10 MG 24 hr tablet Take 1 tablet by mouth once daily.      ranitidine (ZANTAC) 150 MG tablet Take 150 mg by mouth daily          STOP taking these medications       gabapentin (NEURONTIN) 300 MG capsule Comments:   Reason for Stopping:             Allergies   Allergies   Allergen Reactions     Codeine Hives     Sulfa Drugs Hives     Data   Most Recent 3 CBC's:  Recent Labs   Lab Test  05/24/18   1605  05/24/18   0428  05/23/18   1220   05/22/18   2225  05/22/18   1328  12/21/15   1217   WBC   --    --    --    --   6.7  6.9   --    HGB  10.4*  8.7*  8.8*   < >  9.3*  10.6*  12.9   MCV   --    --    --    --   87  85  85   PLT   --    --    --    --   180  210  326    < > = values in this interval not displayed.      Most Recent 3 BMP's:  Recent Labs   Lab Test  05/22/18   1328  12/21/15   1219   NA  143  138   POTASSIUM  4.3  4.1   CHLORIDE  110*  106   CO2  24  25   BUN  24  14   CR  0.73  0.69*   ANIONGAP  9   --    YESI  9.1  9.1   GLC  121*  112*     Most Recent 2 LFT's:  Recent Labs   Lab Test  05/22/18   1328  12/21/15   1219   AST  21   --    ALT  26   --    ALKPHOS  52  55   BILITOTAL  0.2*  0.3     Most Recent INR's and Anticoagulation Dosing History:  Anticoagulation Dose History     Recent Dosing and Labs Latest Ref Rng & Units 5/22/2018    INR 0 - 1.3 0.93        Most Recent 3 Troponin's:No lab results found.  Most Recent  Cholesterol Panel:No lab results found.  Most Recent 6 Bacteria Isolates From Any Culture (See EPIC Reports for Culture Details):No lab results found.  Most Recent TSH, T4 and A1c Labs:No lab results found.  Results for orders placed or performed during the hospital encounter of 05/22/18   US Pelvic Complete with Transvaginal    Narrative    EXAM:    US Pelvis Complete, Transabdominal    EXAM DATE/TIME:    Exam ordered 5/22/2018 11:09 PM    CLINICAL HISTORY:    47 years old, female; Signs and symptoms; Menstruation abnormalities;   Excessive menstruation; With irregular cycle; Prior surgery; Surgery date: 1-6   months; Surgery type: Patient states she had a left oophorectomy and d\T\c.   2/4/2018 in [facility]; Patient HX: Heavy menses for 4 days. Passing clots. ;   Additional info: Menometrorrhagia and pelvic pain.    TECHNIQUE:Color Doppler flow imaging utilized.    Real-time transabdominal pelvic ultrasound (complete) with image   documentation.      COMPARISON:    No relevant prior studies available.    FINDINGS:    Uterus/cervix:  The uterus measures 9.2 x 4.8 x 6 cm.  Multiple isoechoic   small uterine fibroids suspected largest measuring 2.1 cm.  No pathologic solid   masses or fluid collections within the heterogeneous 14 mm endometrium.    Right ovary:  The right ovary measures 3 x 2.5 cm.  1.8 cm hypoechoic right   ovarian simple cyst.  There is normal positive RIGHT ovarian color Doppler flow   demonstrated without evidence of ovarian torsion.  No right adnexal mass.    Left ovary:  The left ovary is not seen.  No left adnexal mass.    Free fluid:  No pathologic free fluid within the pelvis.    Bladder:  Unremarkable as visualized.  Wall is normal thickness for degree of   distention.      Impression    IMPRESSION:         Small right ovarian cyst.      Thickened endometrial stripe for patient age which may represent endometrial   hyperplasia, endometrial polyp, or less likely endometrial neoplasm.       Fibroid uterus.      Remainder of findings as above.        EXAM:    US Pelvis Complete, transvaginal    EXAM DATE/TIME:    Exam ordered 5/22/2018 11:09 PM    CLINICAL HISTORY:    47 years old, female; Signs and symptoms; Menstruation abnormalities;   Excessive menstruation; With irregular cycle; Prior surgery; Surgery date: 1-6   months; Surgery type: Patient states she had a left oophorectomy and d\T\c.   2/4/2018 in [facility]; Patient HX: Heavy menses for 4 days. Passing clots. ;   Additional info: Menometrorrhagia and pelvic pain.    TECHNIQUE:Color Doppler flow imaging utilized.Transvaginal imaging performed   for better evaluation of pelvic structures.    Real-time transvaginal pelvic ultrasound (complete) with image documentation.        COMPARISON:    No relevant prior studies available.    FINDINGS:  Uterus/cervix:  The uterus measures 9.8 x 4.8 x 6 cm.  Multiple isoechoic small   uterine fibroids suspected largest measuring 2.1 cm.  No pathologic solid   masses or fluid collections within the heterogeneous 11 mm endometrium.    Right ovary:  The right ovary measures 2.2 x 2.5 cm.  1.8 cm simple   hypoechoic right ovarian cyst.  There is normal positive RIGHT ovarian color   Doppler flow demonstrated without evidence of ovarian torsion.  No right   adnexal mass.    Left ovary:  The left is unremarkable measuring 2.3 x 2.2 cm.  No left   adnexal mass. There is normal positive left ovarian color Doppler flow   demonstrated without evidence of ovarian torsion.      Free fluid:  No pathologic free fluid within the pelvis.    Bladder:  Unremarkable as visualized.  Wall is normal thickness for degree of   distention.    IMPRESSION:         Small right ovarian cyst.      Thickened endometrial stripe for patient age which may represent endometrial   hyperplasia, endometrial polyp, or less likely endometrial neoplasm.      Fibroid uterus.      Remainder of findings as above.    THIS DOCUMENT HAS BEEN ELECTRONICALLY  SIGNED BY ALEJANDRO HERNÁNDEZ MD   MR Brain w/o & w Contrast    Narrative    EXAM:  MR BRAIN W/O & W CONTRAST    HISTORY:  dizziness, vision change; . .    TECHNIQUE:  Sagittal T1, axial T1, T2, FLAIR, diffusion, echo planar  as well as axial and coronal postcontrast imaging of the whole brain  was performed.    COMPARISON:  None.     FINDINGS:    The ventricles and sulci are normal. No abnormal extra-axial  collection, hydrocephalus, mass effect or midline shift is seen. The  basal cisterns are preserved.    The parenchymal signal is within normal limits. No abnormal  intracranial enhancement or concerning T2* gradient susceptibility is  identified. No restricted diffusion is present.  The major  intracranial vascular flow voids are preserved.     The T1 marrow signal is unremarkable. The orbits are intact. The  mastoid air cells and visualized paranasal sinuses demonstrate no  abnormal fluid.        Impression    IMPRESSION: Normal MR appearance of the brain.    KIEL RICHARDSON MD

## 2018-05-28 LAB
BACTERIA SPEC CULT: NORMAL
SPECIMEN SOURCE: NORMAL

## 2020-12-16 ENCOUNTER — APPOINTMENT (OUTPATIENT)
Dept: CT IMAGING | Facility: CLINIC | Age: 50
End: 2020-12-16
Attending: EMERGENCY MEDICINE
Payer: COMMERCIAL

## 2020-12-16 ENCOUNTER — HOSPITAL ENCOUNTER (OUTPATIENT)
Facility: CLINIC | Age: 50
Setting detail: OBSERVATION
Discharge: HOME | End: 2020-12-18
Attending: EMERGENCY MEDICINE | Admitting: INTERNAL MEDICINE
Payer: COMMERCIAL

## 2020-12-16 DIAGNOSIS — N20.0 KIDNEY STONE: ICD-10-CM

## 2020-12-16 DIAGNOSIS — N20.1 RIGHT URETERAL CALCULUS: ICD-10-CM

## 2020-12-16 LAB
ALBUMIN SERPL-MCNC: 3.4 G/DL (ref 3.4–5)
ALBUMIN UR-MCNC: 70 MG/DL
ALP SERPL-CCNC: 132 U/L (ref 40–150)
ALT SERPL W P-5'-P-CCNC: 182 U/L (ref 0–50)
ANION GAP SERPL CALCULATED.3IONS-SCNC: 4 MMOL/L (ref 3–14)
APPEARANCE UR: ABNORMAL
AST SERPL W P-5'-P-CCNC: 122 U/L (ref 0–45)
BACTERIA #/AREA URNS HPF: ABNORMAL /HPF
BASOPHILS # BLD AUTO: 0.1 10E9/L (ref 0–0.2)
BASOPHILS NFR BLD AUTO: 0.7 %
BILIRUB SERPL-MCNC: 0.3 MG/DL (ref 0.2–1.3)
BILIRUB UR QL STRIP: NEGATIVE
BUN SERPL-MCNC: 15 MG/DL (ref 7–30)
CALCIUM SERPL-MCNC: 9 MG/DL (ref 8.5–10.1)
CAOX CRY #/AREA URNS HPF: ABNORMAL /HPF
CHLORIDE SERPL-SCNC: 107 MMOL/L (ref 94–109)
CO2 SERPL-SCNC: 29 MMOL/L (ref 20–32)
COLOR UR AUTO: YELLOW
CREAT SERPL-MCNC: 0.66 MG/DL (ref 0.52–1.04)
DIFFERENTIAL METHOD BLD: NORMAL
EOSINOPHIL # BLD AUTO: 0.1 10E9/L (ref 0–0.7)
EOSINOPHIL NFR BLD AUTO: 0.9 %
ERYTHROCYTE [DISTWIDTH] IN BLOOD BY AUTOMATED COUNT: 12.3 % (ref 10–15)
GFR SERPL CREATININE-BSD FRML MDRD: >90 ML/MIN/{1.73_M2}
GLUCOSE SERPL-MCNC: 212 MG/DL (ref 70–99)
GLUCOSE UR STRIP-MCNC: 70 MG/DL
HCT VFR BLD AUTO: 44.9 % (ref 35–47)
HGB BLD-MCNC: 15.1 G/DL (ref 11.7–15.7)
HGB UR QL STRIP: ABNORMAL
IMM GRANULOCYTES # BLD: 0 10E9/L (ref 0–0.4)
IMM GRANULOCYTES NFR BLD: 0.3 %
KETONES UR STRIP-MCNC: ABNORMAL MG/DL
LEUKOCYTE ESTERASE UR QL STRIP: NEGATIVE
LIPASE SERPL-CCNC: 90 U/L (ref 73–393)
LYMPHOCYTES # BLD AUTO: 1.7 10E9/L (ref 0.8–5.3)
LYMPHOCYTES NFR BLD AUTO: 22.2 %
MCH RBC QN AUTO: 32.3 PG (ref 26.5–33)
MCHC RBC AUTO-ENTMCNC: 33.6 G/DL (ref 31.5–36.5)
MCV RBC AUTO: 96 FL (ref 78–100)
MONOCYTES # BLD AUTO: 0.5 10E9/L (ref 0–1.3)
MONOCYTES NFR BLD AUTO: 6.9 %
MUCOUS THREADS #/AREA URNS LPF: PRESENT /LPF
NEUTROPHILS # BLD AUTO: 5.3 10E9/L (ref 1.6–8.3)
NEUTROPHILS NFR BLD AUTO: 69 %
NITRATE UR QL: NEGATIVE
NRBC # BLD AUTO: 0 10*3/UL
NRBC BLD AUTO-RTO: 0 /100
PH UR STRIP: 6 PH (ref 5–7)
PLATELET # BLD AUTO: 230 10E9/L (ref 150–450)
POTASSIUM SERPL-SCNC: 3.8 MMOL/L (ref 3.4–5.3)
PROT SERPL-MCNC: 7.7 G/DL (ref 6.8–8.8)
RBC # BLD AUTO: 4.68 10E12/L (ref 3.8–5.2)
RBC #/AREA URNS AUTO: >182 /HPF (ref 0–2)
SODIUM SERPL-SCNC: 140 MMOL/L (ref 133–144)
SOURCE: ABNORMAL
SP GR UR STRIP: 1.03 (ref 1–1.03)
SQUAMOUS #/AREA URNS AUTO: 1 /HPF (ref 0–1)
UROBILINOGEN UR STRIP-MCNC: NORMAL MG/DL (ref 0–2)
WBC # BLD AUTO: 7.7 10E9/L (ref 4–11)
WBC #/AREA URNS AUTO: 21 /HPF (ref 0–5)

## 2020-12-16 PROCEDURE — 85025 COMPLETE CBC W/AUTO DIFF WBC: CPT | Performed by: EMERGENCY MEDICINE

## 2020-12-16 PROCEDURE — 80053 COMPREHEN METABOLIC PANEL: CPT | Performed by: EMERGENCY MEDICINE

## 2020-12-16 PROCEDURE — 99285 EMERGENCY DEPT VISIT HI MDM: CPT | Mod: 25

## 2020-12-16 PROCEDURE — 96361 HYDRATE IV INFUSION ADD-ON: CPT

## 2020-12-16 PROCEDURE — 74177 CT ABD & PELVIS W/CONTRAST: CPT

## 2020-12-16 PROCEDURE — 83690 ASSAY OF LIPASE: CPT | Performed by: EMERGENCY MEDICINE

## 2020-12-16 PROCEDURE — 250N000011 HC RX IP 250 OP 636: Performed by: EMERGENCY MEDICINE

## 2020-12-16 PROCEDURE — 96374 THER/PROPH/DIAG INJ IV PUSH: CPT | Mod: 59

## 2020-12-16 PROCEDURE — 87086 URINE CULTURE/COLONY COUNT: CPT | Performed by: EMERGENCY MEDICINE

## 2020-12-16 PROCEDURE — 258N000003 HC RX IP 258 OP 636: Performed by: EMERGENCY MEDICINE

## 2020-12-16 PROCEDURE — 81001 URINALYSIS AUTO W/SCOPE: CPT | Performed by: EMERGENCY MEDICINE

## 2020-12-16 PROCEDURE — 96375 TX/PRO/DX INJ NEW DRUG ADDON: CPT

## 2020-12-16 RX ORDER — KETOROLAC TROMETHAMINE 15 MG/ML
15 INJECTION, SOLUTION INTRAMUSCULAR; INTRAVENOUS ONCE
Status: COMPLETED | OUTPATIENT
Start: 2020-12-16 | End: 2020-12-16

## 2020-12-16 RX ORDER — FENTANYL CITRATE 50 UG/ML
75 INJECTION, SOLUTION INTRAMUSCULAR; INTRAVENOUS ONCE
Status: COMPLETED | OUTPATIENT
Start: 2020-12-16 | End: 2020-12-16

## 2020-12-16 RX ORDER — IOPAMIDOL 755 MG/ML
500 INJECTION, SOLUTION INTRAVASCULAR ONCE
Status: COMPLETED | OUTPATIENT
Start: 2020-12-16 | End: 2020-12-16

## 2020-12-16 RX ADMIN — IOPAMIDOL 100 ML: 755 INJECTION, SOLUTION INTRAVENOUS at 22:42

## 2020-12-16 RX ADMIN — FENTANYL CITRATE 75 MCG: 50 INJECTION, SOLUTION INTRAMUSCULAR; INTRAVENOUS at 22:17

## 2020-12-16 RX ADMIN — SODIUM CHLORIDE 1000 ML: 9 INJECTION, SOLUTION INTRAVENOUS at 23:09

## 2020-12-16 RX ADMIN — KETOROLAC TROMETHAMINE 15 MG: 15 INJECTION, SOLUTION INTRAMUSCULAR; INTRAVENOUS at 23:06

## 2020-12-16 RX ADMIN — HYDROMORPHONE HYDROCHLORIDE 1 MG: 1 INJECTION, SOLUTION INTRAMUSCULAR; INTRAVENOUS; SUBCUTANEOUS at 23:29

## 2020-12-16 ASSESSMENT — ENCOUNTER SYMPTOMS
FEVER: 0
BACK PAIN: 1
VOMITING: 0
DIARRHEA: 0
CONSTIPATION: 1
ABDOMINAL PAIN: 1
NAUSEA: 0
COUGH: 0

## 2020-12-17 ENCOUNTER — APPOINTMENT (OUTPATIENT)
Dept: GENERAL RADIOLOGY | Facility: CLINIC | Age: 50
End: 2020-12-17
Attending: INTERNAL MEDICINE
Payer: COMMERCIAL

## 2020-12-17 ENCOUNTER — PREP FOR PROCEDURE (OUTPATIENT)
Dept: UROLOGY | Facility: CLINIC | Age: 50
End: 2020-12-17

## 2020-12-17 ENCOUNTER — ANESTHESIA (OUTPATIENT)
Dept: SURGERY | Facility: CLINIC | Age: 50
End: 2020-12-17
Payer: COMMERCIAL

## 2020-12-17 ENCOUNTER — ANESTHESIA EVENT (OUTPATIENT)
Dept: SURGERY | Facility: CLINIC | Age: 50
End: 2020-12-17
Payer: COMMERCIAL

## 2020-12-17 DIAGNOSIS — N20.1 RIGHT URETERAL CALCULUS: Primary | ICD-10-CM

## 2020-12-17 PROBLEM — N20.0 KIDNEY STONE: Status: ACTIVE | Noted: 2020-12-17

## 2020-12-17 LAB
ANION GAP SERPL CALCULATED.3IONS-SCNC: 1 MMOL/L (ref 3–14)
BUN SERPL-MCNC: 15 MG/DL (ref 7–30)
CALCIUM SERPL-MCNC: 8.6 MG/DL (ref 8.5–10.1)
CHLORIDE SERPL-SCNC: 109 MMOL/L (ref 94–109)
CO2 SERPL-SCNC: 30 MMOL/L (ref 20–32)
CREAT SERPL-MCNC: 0.72 MG/DL (ref 0.52–1.04)
FLUAV+FLUBV RNA SPEC QL NAA+PROBE: NEGATIVE
FLUAV+FLUBV RNA SPEC QL NAA+PROBE: NEGATIVE
GFR SERPL CREATININE-BSD FRML MDRD: >90 ML/MIN/{1.73_M2}
GLUCOSE BLDC GLUCOMTR-MCNC: 123 MG/DL (ref 70–99)
GLUCOSE BLDC GLUCOMTR-MCNC: 162 MG/DL (ref 70–99)
GLUCOSE BLDC GLUCOMTR-MCNC: 287 MG/DL (ref 70–99)
GLUCOSE SERPL-MCNC: 135 MG/DL (ref 70–99)
HBA1C MFR BLD: 7 % (ref 0–5.6)
INR PPP: 1.14 (ref 0.86–1.14)
LABORATORY COMMENT REPORT: NORMAL
POTASSIUM SERPL-SCNC: 4.4 MMOL/L (ref 3.4–5.3)
RSV RNA SPEC QL NAA+PROBE: NEGATIVE
SARS-COV-2 RNA SPEC QL NAA+PROBE: NEGATIVE
SODIUM SERPL-SCNC: 140 MMOL/L (ref 133–144)
SPECIMEN SOURCE: NORMAL

## 2020-12-17 PROCEDURE — 88300 SURGICAL PATH GROSS: CPT | Mod: 26 | Performed by: PATHOLOGY

## 2020-12-17 PROCEDURE — 272N000001 HC OR GENERAL SUPPLY STERILE: Performed by: UROLOGY

## 2020-12-17 PROCEDURE — 96375 TX/PRO/DX INJ NEW DRUG ADDON: CPT

## 2020-12-17 PROCEDURE — 36415 COLL VENOUS BLD VENIPUNCTURE: CPT | Performed by: INTERNAL MEDICINE

## 2020-12-17 PROCEDURE — 761N000002 HC RECOVERY PHASE 1 LEVEL 1 EA ADDTL HR: Performed by: UROLOGY

## 2020-12-17 PROCEDURE — 83036 HEMOGLOBIN GLYCOSYLATED A1C: CPT | Performed by: INTERNAL MEDICINE

## 2020-12-17 PROCEDURE — 250N000013 HC RX MED GY IP 250 OP 250 PS 637: Performed by: INTERNAL MEDICINE

## 2020-12-17 PROCEDURE — 96361 HYDRATE IV INFUSION ADD-ON: CPT | Mod: 59

## 2020-12-17 PROCEDURE — 250N000009 HC RX 250: Performed by: NURSE ANESTHETIST, CERTIFIED REGISTERED

## 2020-12-17 PROCEDURE — 85610 PROTHROMBIN TIME: CPT | Performed by: INTERNAL MEDICINE

## 2020-12-17 PROCEDURE — 258N000003 HC RX IP 258 OP 636: Performed by: INTERNAL MEDICINE

## 2020-12-17 PROCEDURE — 250N000011 HC RX IP 250 OP 636: Performed by: INTERNAL MEDICINE

## 2020-12-17 PROCEDURE — 250N000011 HC RX IP 250 OP 636: Performed by: EMERGENCY MEDICINE

## 2020-12-17 PROCEDURE — G0378 HOSPITAL OBSERVATION PER HR: HCPCS

## 2020-12-17 PROCEDURE — 250N000011 HC RX IP 250 OP 636: Performed by: NURSE ANESTHETIST, CERTIFIED REGISTERED

## 2020-12-17 PROCEDURE — 250N000012 HC RX MED GY IP 250 OP 636 PS 637: Performed by: INTERNAL MEDICINE

## 2020-12-17 PROCEDURE — 258N000003 HC RX IP 258 OP 636: Performed by: NURSE ANESTHETIST, CERTIFIED REGISTERED

## 2020-12-17 PROCEDURE — 761N000007 HC RECOVERY PHASE 2 EACH 15 MINS: Performed by: UROLOGY

## 2020-12-17 PROCEDURE — 99220 PR INITIAL OBSERVATION CARE,LEVEL III: CPT | Performed by: INTERNAL MEDICINE

## 2020-12-17 PROCEDURE — C1769 GUIDE WIRE: HCPCS | Performed by: UROLOGY

## 2020-12-17 PROCEDURE — 999N000136 HC STATISTIC PRE PROC ASSESS II: Performed by: UROLOGY

## 2020-12-17 PROCEDURE — 360N000021 HC SURGERY LEVEL 3 EA 15 ADDTL MIN: Performed by: UROLOGY

## 2020-12-17 PROCEDURE — 999N001017 HC STATISTIC GLUCOSE BY METER IP

## 2020-12-17 PROCEDURE — 258N000001 HC RX 258: Performed by: UROLOGY

## 2020-12-17 PROCEDURE — 87636 SARSCOV2 & INF A&B AMP PRB: CPT | Performed by: EMERGENCY MEDICINE

## 2020-12-17 PROCEDURE — 250N000011 HC RX IP 250 OP 636: Performed by: ANESTHESIOLOGY

## 2020-12-17 PROCEDURE — 258N000003 HC RX IP 258 OP 636: Performed by: ANESTHESIOLOGY

## 2020-12-17 PROCEDURE — 52356 CYSTO/URETERO W/LITHOTRIPSY: CPT | Mod: RT | Performed by: UROLOGY

## 2020-12-17 PROCEDURE — 250N000011 HC RX IP 250 OP 636: Performed by: UROLOGY

## 2020-12-17 PROCEDURE — 999N000179 XR SURGERY CARM FLUORO LESS THAN 5 MIN W STILLS: Mod: TC

## 2020-12-17 PROCEDURE — 93005 ELECTROCARDIOGRAM TRACING: CPT

## 2020-12-17 PROCEDURE — C2617 STENT, NON-COR, TEM W/O DEL: HCPCS | Performed by: UROLOGY

## 2020-12-17 PROCEDURE — 80048 BASIC METABOLIC PNL TOTAL CA: CPT | Performed by: INTERNAL MEDICINE

## 2020-12-17 PROCEDURE — 96376 TX/PRO/DX INJ SAME DRUG ADON: CPT | Mod: 59

## 2020-12-17 PROCEDURE — 360N000024 HC SURGERY LEVEL 3 W FLUORO 1ST 30 MIN: Performed by: UROLOGY

## 2020-12-17 PROCEDURE — 250N000011 HC RX IP 250 OP 636: Performed by: STUDENT IN AN ORGANIZED HEALTH CARE EDUCATION/TRAINING PROGRAM

## 2020-12-17 PROCEDURE — 370N000001 HC ANESTHESIA TECHNICAL FEE, 1ST 30 MIN: Performed by: UROLOGY

## 2020-12-17 PROCEDURE — 370N000002 HC ANESTHESIA TECHNICAL FEE, EACH ADDTL 15 MIN: Performed by: UROLOGY

## 2020-12-17 PROCEDURE — 761N000001 HC RECOVERY PHASE 1 LEVEL 1 FIRST HR: Performed by: UROLOGY

## 2020-12-17 PROCEDURE — 88300 SURGICAL PATH GROSS: CPT | Mod: TC | Performed by: INTERNAL MEDICINE

## 2020-12-17 PROCEDURE — 82365 CALCULUS SPECTROSCOPY: CPT | Performed by: UROLOGY

## 2020-12-17 PROCEDURE — 96372 THER/PROPH/DIAG INJ SC/IM: CPT | Performed by: INTERNAL MEDICINE

## 2020-12-17 DEVICE — STENT URETERAL POLARIS ULTRA 5FRX24CM M0061921220: Type: IMPLANTABLE DEVICE | Site: ABDOMEN | Status: FUNCTIONAL

## 2020-12-17 RX ORDER — POLYETHYLENE GLYCOL 3350 17 G/17G
17 POWDER, FOR SOLUTION ORAL DAILY
Status: DISCONTINUED | OUTPATIENT
Start: 2020-12-17 | End: 2020-12-18 | Stop reason: HOSPADM

## 2020-12-17 RX ORDER — NALOXONE HYDROCHLORIDE 0.4 MG/ML
0.2 INJECTION, SOLUTION INTRAMUSCULAR; INTRAVENOUS; SUBCUTANEOUS
Status: DISCONTINUED | OUTPATIENT
Start: 2020-12-17 | End: 2020-12-17 | Stop reason: HOSPADM

## 2020-12-17 RX ORDER — CLONAZEPAM 0.5 MG/1
0.5 TABLET ORAL DAILY PRN
Status: DISCONTINUED | OUTPATIENT
Start: 2020-12-17 | End: 2020-12-18 | Stop reason: HOSPADM

## 2020-12-17 RX ORDER — LABETALOL 20 MG/4 ML (5 MG/ML) INTRAVENOUS SYRINGE
10
Status: DISCONTINUED | OUTPATIENT
Start: 2020-12-17 | End: 2020-12-17 | Stop reason: HOSPADM

## 2020-12-17 RX ORDER — NICOTINE POLACRILEX 4 MG
15-30 LOZENGE BUCCAL
Status: DISCONTINUED | OUTPATIENT
Start: 2020-12-17 | End: 2020-12-18 | Stop reason: HOSPADM

## 2020-12-17 RX ORDER — CLONAZEPAM 0.5 MG/1
0.5 TABLET ORAL DAILY PRN
COMMUNITY

## 2020-12-17 RX ORDER — LIDOCAINE 40 MG/G
CREAM TOPICAL
Status: DISCONTINUED | OUTPATIENT
Start: 2020-12-17 | End: 2020-12-17 | Stop reason: HOSPADM

## 2020-12-17 RX ORDER — SODIUM CHLORIDE, SODIUM LACTATE, POTASSIUM CHLORIDE, CALCIUM CHLORIDE 600; 310; 30; 20 MG/100ML; MG/100ML; MG/100ML; MG/100ML
INJECTION, SOLUTION INTRAVENOUS CONTINUOUS
Status: DISCONTINUED | OUTPATIENT
Start: 2020-12-17 | End: 2020-12-17 | Stop reason: HOSPADM

## 2020-12-17 RX ORDER — LIDOCAINE HYDROCHLORIDE 40 MG/ML
SOLUTION TOPICAL PRN
Status: DISCONTINUED | OUTPATIENT
Start: 2020-12-17 | End: 2020-12-17

## 2020-12-17 RX ORDER — OXYCODONE HYDROCHLORIDE 5 MG/1
5-10 TABLET ORAL
Status: DISCONTINUED | OUTPATIENT
Start: 2020-12-17 | End: 2020-12-18 | Stop reason: HOSPADM

## 2020-12-17 RX ORDER — ARIPIPRAZOLE 2 MG/1
2 TABLET ORAL DAILY
Status: DISCONTINUED | OUTPATIENT
Start: 2020-12-17 | End: 2020-12-18 | Stop reason: HOSPADM

## 2020-12-17 RX ORDER — ONDANSETRON 4 MG/1
4 TABLET, ORALLY DISINTEGRATING ORAL EVERY 30 MIN PRN
Status: DISCONTINUED | OUTPATIENT
Start: 2020-12-17 | End: 2020-12-17 | Stop reason: HOSPADM

## 2020-12-17 RX ORDER — NALOXONE HYDROCHLORIDE 0.4 MG/ML
0.2 INJECTION, SOLUTION INTRAMUSCULAR; INTRAVENOUS; SUBCUTANEOUS
Status: DISCONTINUED | OUTPATIENT
Start: 2020-12-17 | End: 2020-12-18 | Stop reason: HOSPADM

## 2020-12-17 RX ORDER — CEFAZOLIN SODIUM 2 G/100ML
2 INJECTION, SOLUTION INTRAVENOUS
Status: COMPLETED | OUTPATIENT
Start: 2020-12-17 | End: 2020-12-17

## 2020-12-17 RX ORDER — AMOXICILLIN 250 MG
2 CAPSULE ORAL 2 TIMES DAILY
Status: DISCONTINUED | OUTPATIENT
Start: 2020-12-17 | End: 2020-12-18 | Stop reason: HOSPADM

## 2020-12-17 RX ORDER — MULTIVIT WITH MINERALS/LUTEIN
1000 TABLET ORAL DAILY
Status: ON HOLD | COMMUNITY
End: 2020-12-17

## 2020-12-17 RX ORDER — NEOSTIGMINE METHYLSULFATE 1 MG/ML
VIAL (ML) INJECTION PRN
Status: DISCONTINUED | OUTPATIENT
Start: 2020-12-17 | End: 2020-12-17

## 2020-12-17 RX ORDER — METOPROLOL SUCCINATE 50 MG/1
50 TABLET, EXTENDED RELEASE ORAL DAILY
Status: DISCONTINUED | OUTPATIENT
Start: 2020-12-17 | End: 2020-12-18 | Stop reason: HOSPADM

## 2020-12-17 RX ORDER — LIDOCAINE HYDROCHLORIDE 10 MG/ML
INJECTION, SOLUTION INFILTRATION; PERINEURAL PRN
Status: DISCONTINUED | OUTPATIENT
Start: 2020-12-17 | End: 2020-12-17

## 2020-12-17 RX ORDER — HYDROMORPHONE HYDROCHLORIDE 1 MG/ML
.3-.5 INJECTION, SOLUTION INTRAMUSCULAR; INTRAVENOUS; SUBCUTANEOUS EVERY 10 MIN PRN
Status: DISCONTINUED | OUTPATIENT
Start: 2020-12-17 | End: 2020-12-17 | Stop reason: HOSPADM

## 2020-12-17 RX ORDER — ONDANSETRON 4 MG/1
4 TABLET, ORALLY DISINTEGRATING ORAL EVERY 6 HOURS PRN
Status: DISCONTINUED | OUTPATIENT
Start: 2020-12-17 | End: 2020-12-18 | Stop reason: HOSPADM

## 2020-12-17 RX ORDER — GLYCOPYRROLATE 0.2 MG/ML
INJECTION, SOLUTION INTRAMUSCULAR; INTRAVENOUS PRN
Status: DISCONTINUED | OUTPATIENT
Start: 2020-12-17 | End: 2020-12-17

## 2020-12-17 RX ORDER — SODIUM CHLORIDE 9 MG/ML
INJECTION, SOLUTION INTRAVENOUS CONTINUOUS
Status: DISCONTINUED | OUTPATIENT
Start: 2020-12-17 | End: 2020-12-18 | Stop reason: HOSPADM

## 2020-12-17 RX ORDER — MEPERIDINE HYDROCHLORIDE 25 MG/ML
12.5 INJECTION INTRAMUSCULAR; INTRAVENOUS; SUBCUTANEOUS
Status: DISCONTINUED | OUTPATIENT
Start: 2020-12-17 | End: 2020-12-17 | Stop reason: HOSPADM

## 2020-12-17 RX ORDER — CEFAZOLIN SODIUM 1 G
1 VIAL (EA) INJECTION SEE ADMIN INSTRUCTIONS
Status: CANCELLED | OUTPATIENT
Start: 2020-12-17

## 2020-12-17 RX ORDER — NALOXONE HYDROCHLORIDE 0.4 MG/ML
0.4 INJECTION, SOLUTION INTRAMUSCULAR; INTRAVENOUS; SUBCUTANEOUS
Status: DISCONTINUED | OUTPATIENT
Start: 2020-12-17 | End: 2020-12-18 | Stop reason: HOSPADM

## 2020-12-17 RX ORDER — POLYETHYLENE GLYCOL 3350 17 G/17G
17 POWDER, FOR SOLUTION ORAL DAILY PRN
Status: DISCONTINUED | OUTPATIENT
Start: 2020-12-17 | End: 2020-12-18 | Stop reason: HOSPADM

## 2020-12-17 RX ORDER — HYDROMORPHONE HYDROCHLORIDE 1 MG/ML
0.5 INJECTION, SOLUTION INTRAMUSCULAR; INTRAVENOUS; SUBCUTANEOUS ONCE
Status: COMPLETED | OUTPATIENT
Start: 2020-12-17 | End: 2020-12-17

## 2020-12-17 RX ORDER — HYDROMORPHONE HYDROCHLORIDE 1 MG/ML
0.3 INJECTION, SOLUTION INTRAMUSCULAR; INTRAVENOUS; SUBCUTANEOUS
Status: DISCONTINUED | OUTPATIENT
Start: 2020-12-17 | End: 2020-12-18 | Stop reason: HOSPADM

## 2020-12-17 RX ORDER — GABAPENTIN 800 MG/1
800 TABLET ORAL 3 TIMES DAILY
COMMUNITY

## 2020-12-17 RX ORDER — OXYBUTYNIN CHLORIDE 5 MG/1
20 TABLET, EXTENDED RELEASE ORAL DAILY
Status: DISCONTINUED | OUTPATIENT
Start: 2020-12-17 | End: 2020-12-18 | Stop reason: HOSPADM

## 2020-12-17 RX ORDER — ALBUTEROL SULFATE 0.83 MG/ML
2.5 SOLUTION RESPIRATORY (INHALATION) EVERY 4 HOURS PRN
Status: DISCONTINUED | OUTPATIENT
Start: 2020-12-17 | End: 2020-12-17 | Stop reason: HOSPADM

## 2020-12-17 RX ORDER — METOPROLOL SUCCINATE 50 MG/1
50 TABLET, EXTENDED RELEASE ORAL DAILY
COMMUNITY

## 2020-12-17 RX ORDER — PROPOFOL 10 MG/ML
INJECTION, EMULSION INTRAVENOUS PRN
Status: DISCONTINUED | OUTPATIENT
Start: 2020-12-17 | End: 2020-12-17

## 2020-12-17 RX ORDER — ATORVASTATIN CALCIUM 40 MG/1
40 TABLET, FILM COATED ORAL DAILY
Status: DISCONTINUED | OUTPATIENT
Start: 2020-12-17 | End: 2020-12-18 | Stop reason: HOSPADM

## 2020-12-17 RX ORDER — ONDANSETRON 2 MG/ML
4 INJECTION INTRAMUSCULAR; INTRAVENOUS EVERY 30 MIN PRN
Status: DISCONTINUED | OUTPATIENT
Start: 2020-12-17 | End: 2020-12-17 | Stop reason: HOSPADM

## 2020-12-17 RX ORDER — TAMSULOSIN HYDROCHLORIDE 0.4 MG/1
0.4 CAPSULE ORAL DAILY
Status: DISCONTINUED | OUTPATIENT
Start: 2020-12-17 | End: 2020-12-18 | Stop reason: HOSPADM

## 2020-12-17 RX ORDER — FENTANYL CITRATE 50 UG/ML
25-50 INJECTION, SOLUTION INTRAMUSCULAR; INTRAVENOUS
Status: DISCONTINUED | OUTPATIENT
Start: 2020-12-17 | End: 2020-12-17 | Stop reason: HOSPADM

## 2020-12-17 RX ORDER — HYDRALAZINE HYDROCHLORIDE 20 MG/ML
2.5-5 INJECTION INTRAMUSCULAR; INTRAVENOUS EVERY 10 MIN PRN
Status: DISCONTINUED | OUTPATIENT
Start: 2020-12-17 | End: 2020-12-17 | Stop reason: HOSPADM

## 2020-12-17 RX ORDER — DEXAMETHASONE SODIUM PHOSPHATE 4 MG/ML
INJECTION, SOLUTION INTRA-ARTICULAR; INTRALESIONAL; INTRAMUSCULAR; INTRAVENOUS; SOFT TISSUE PRN
Status: DISCONTINUED | OUTPATIENT
Start: 2020-12-17 | End: 2020-12-17

## 2020-12-17 RX ORDER — CEFAZOLIN SODIUM 1 G/3ML
1 INJECTION, POWDER, FOR SOLUTION INTRAMUSCULAR; INTRAVENOUS SEE ADMIN INSTRUCTIONS
Status: DISCONTINUED | OUTPATIENT
Start: 2020-12-17 | End: 2020-12-17 | Stop reason: HOSPADM

## 2020-12-17 RX ORDER — GABAPENTIN 400 MG/1
800 CAPSULE ORAL 3 TIMES DAILY
Status: DISCONTINUED | OUTPATIENT
Start: 2020-12-17 | End: 2020-12-18 | Stop reason: HOSPADM

## 2020-12-17 RX ORDER — NALOXONE HYDROCHLORIDE 0.4 MG/ML
0.4 INJECTION, SOLUTION INTRAMUSCULAR; INTRAVENOUS; SUBCUTANEOUS
Status: DISCONTINUED | OUTPATIENT
Start: 2020-12-17 | End: 2020-12-17 | Stop reason: HOSPADM

## 2020-12-17 RX ORDER — ARIPIPRAZOLE 2 MG/1
2 TABLET ORAL DAILY
COMMUNITY

## 2020-12-17 RX ORDER — BUPROPION HYDROCHLORIDE 100 MG/1
100 TABLET, EXTENDED RELEASE ORAL DAILY
COMMUNITY

## 2020-12-17 RX ORDER — EPHEDRINE SULFATE 50 MG/ML
INJECTION, SOLUTION INTRAMUSCULAR; INTRAVENOUS; SUBCUTANEOUS PRN
Status: DISCONTINUED | OUTPATIENT
Start: 2020-12-17 | End: 2020-12-17

## 2020-12-17 RX ORDER — DEXTROSE MONOHYDRATE 25 G/50ML
25-50 INJECTION, SOLUTION INTRAVENOUS
Status: DISCONTINUED | OUTPATIENT
Start: 2020-12-17 | End: 2020-12-18 | Stop reason: HOSPADM

## 2020-12-17 RX ORDER — FENTANYL CITRATE 50 UG/ML
INJECTION, SOLUTION INTRAMUSCULAR; INTRAVENOUS PRN
Status: DISCONTINUED | OUTPATIENT
Start: 2020-12-17 | End: 2020-12-17

## 2020-12-17 RX ORDER — KETOROLAC TROMETHAMINE 30 MG/ML
30 INJECTION, SOLUTION INTRAMUSCULAR; INTRAVENOUS EVERY 6 HOURS
Status: DISCONTINUED | OUTPATIENT
Start: 2020-12-17 | End: 2020-12-18 | Stop reason: HOSPADM

## 2020-12-17 RX ORDER — LISDEXAMFETAMINE DIMESYLATE 10 MG/1
10 CAPSULE ORAL EVERY 24 HOURS
COMMUNITY

## 2020-12-17 RX ORDER — ONDANSETRON 2 MG/ML
4 INJECTION INTRAMUSCULAR; INTRAVENOUS EVERY 6 HOURS PRN
Status: DISCONTINUED | OUTPATIENT
Start: 2020-12-17 | End: 2020-12-18 | Stop reason: HOSPADM

## 2020-12-17 RX ORDER — LISDEXAMFETAMINE DIMESYLATE 50 MG/1
50 CAPSULE ORAL EVERY MORNING
COMMUNITY

## 2020-12-17 RX ORDER — CIPROFLOXACIN 500 MG/1
500 TABLET, FILM COATED ORAL EVERY 12 HOURS
Qty: 2 TABLET | Refills: 0 | Status: SHIPPED | OUTPATIENT
Start: 2020-12-17

## 2020-12-17 RX ORDER — AMOXICILLIN 250 MG
1 CAPSULE ORAL 2 TIMES DAILY
Status: DISCONTINUED | OUTPATIENT
Start: 2020-12-17 | End: 2020-12-18 | Stop reason: HOSPADM

## 2020-12-17 RX ORDER — BUPROPION HYDROCHLORIDE 100 MG/1
100 TABLET, EXTENDED RELEASE ORAL DAILY
Status: DISCONTINUED | OUTPATIENT
Start: 2020-12-17 | End: 2020-12-18 | Stop reason: HOSPADM

## 2020-12-17 RX ADMIN — DOCUSATE SODIUM AND SENNOSIDES 1 TABLET: 8.6; 5 TABLET ORAL at 08:09

## 2020-12-17 RX ADMIN — CEFAZOLIN SODIUM 2 G: 2 INJECTION, SOLUTION INTRAVENOUS at 14:56

## 2020-12-17 RX ADMIN — OXYCODONE HYDROCHLORIDE 5 MG: 5 TABLET ORAL at 08:10

## 2020-12-17 RX ADMIN — METOPROLOL SUCCINATE 50 MG: 50 TABLET, EXTENDED RELEASE ORAL at 10:53

## 2020-12-17 RX ADMIN — SODIUM CHLORIDE, POTASSIUM CHLORIDE, SODIUM LACTATE AND CALCIUM CHLORIDE: 600; 310; 30; 20 INJECTION, SOLUTION INTRAVENOUS at 14:30

## 2020-12-17 RX ADMIN — ONDANSETRON 4 MG: 2 INJECTION INTRAMUSCULAR; INTRAVENOUS at 15:15

## 2020-12-17 RX ADMIN — PHENYLEPHRINE HYDROCHLORIDE 200 MCG: 10 INJECTION INTRAVENOUS at 15:01

## 2020-12-17 RX ADMIN — KETOROLAC TROMETHAMINE 30 MG: 30 INJECTION, SOLUTION INTRAMUSCULAR at 10:50

## 2020-12-17 RX ADMIN — PHENYLEPHRINE HYDROCHLORIDE 200 MCG: 10 INJECTION INTRAVENOUS at 14:55

## 2020-12-17 RX ADMIN — FENTANYL CITRATE 50 MCG: 50 INJECTION, SOLUTION INTRAMUSCULAR; INTRAVENOUS at 15:53

## 2020-12-17 RX ADMIN — Medication 5 MG: at 14:55

## 2020-12-17 RX ADMIN — KETOROLAC TROMETHAMINE 30 MG: 30 INJECTION, SOLUTION INTRAMUSCULAR at 19:06

## 2020-12-17 RX ADMIN — PHENYLEPHRINE HYDROCHLORIDE 200 MCG: 10 INJECTION INTRAVENOUS at 15:03

## 2020-12-17 RX ADMIN — TAMSULOSIN HYDROCHLORIDE 0.4 MG: 0.4 CAPSULE ORAL at 01:51

## 2020-12-17 RX ADMIN — Medication 4 MG: at 15:26

## 2020-12-17 RX ADMIN — FENTANYL CITRATE 100 MCG: 50 INJECTION, SOLUTION INTRAMUSCULAR; INTRAVENOUS at 14:49

## 2020-12-17 RX ADMIN — BUPROPION HYDROCHLORIDE 100 MG: 100 TABLET, FILM COATED, EXTENDED RELEASE ORAL at 10:53

## 2020-12-17 RX ADMIN — HYDROMORPHONE HYDROCHLORIDE 0.3 MG: 1 INJECTION, SOLUTION INTRAMUSCULAR; INTRAVENOUS; SUBCUTANEOUS at 01:50

## 2020-12-17 RX ADMIN — SODIUM CHLORIDE: 9 INJECTION, SOLUTION INTRAVENOUS at 01:42

## 2020-12-17 RX ADMIN — ROCURONIUM BROMIDE 40 MG: 10 INJECTION INTRAVENOUS at 14:50

## 2020-12-17 RX ADMIN — ONDANSETRON HYDROCHLORIDE 4 MG: 2 INJECTION, SOLUTION INTRAMUSCULAR; INTRAVENOUS at 17:54

## 2020-12-17 RX ADMIN — ONDANSETRON 4 MG: 2 INJECTION INTRAMUSCULAR; INTRAVENOUS at 01:47

## 2020-12-17 RX ADMIN — ARIPIPRAZOLE 2 MG: 2 TABLET ORAL at 11:37

## 2020-12-17 RX ADMIN — HYDROMORPHONE HYDROCHLORIDE 0.3 MG: 1 INJECTION, SOLUTION INTRAMUSCULAR; INTRAVENOUS; SUBCUTANEOUS at 06:54

## 2020-12-17 RX ADMIN — DEXAMETHASONE SODIUM PHOSPHATE 4 MG: 4 INJECTION, SOLUTION INTRA-ARTICULAR; INTRALESIONAL; INTRAMUSCULAR; INTRAVENOUS; SOFT TISSUE at 14:50

## 2020-12-17 RX ADMIN — LIDOCAINE HYDROCHLORIDE 50 MG: 10 INJECTION, SOLUTION INFILTRATION; PERINEURAL at 14:49

## 2020-12-17 RX ADMIN — MIDAZOLAM 2 MG: 1 INJECTION INTRAMUSCULAR; INTRAVENOUS at 14:40

## 2020-12-17 RX ADMIN — PROPOFOL 120 MG: 10 INJECTION, EMULSION INTRAVENOUS at 14:49

## 2020-12-17 RX ADMIN — GABAPENTIN 800 MG: 400 CAPSULE ORAL at 19:42

## 2020-12-17 RX ADMIN — LIDOCAINE HYDROCHLORIDE 4 ML: 40 SOLUTION TOPICAL at 14:51

## 2020-12-17 RX ADMIN — SODIUM CHLORIDE, POTASSIUM CHLORIDE, SODIUM LACTATE AND CALCIUM CHLORIDE: 600; 310; 30; 20 INJECTION, SOLUTION INTRAVENOUS at 15:27

## 2020-12-17 RX ADMIN — HYDROMORPHONE HYDROCHLORIDE 0.3 MG: 1 INJECTION, SOLUTION INTRAMUSCULAR; INTRAVENOUS; SUBCUTANEOUS at 10:47

## 2020-12-17 RX ADMIN — ATORVASTATIN CALCIUM 40 MG: 40 TABLET, FILM COATED ORAL at 10:53

## 2020-12-17 RX ADMIN — PHENYLEPHRINE HYDROCHLORIDE 300 MCG: 10 INJECTION INTRAVENOUS at 15:00

## 2020-12-17 RX ADMIN — HYDROMORPHONE HYDROCHLORIDE 0.5 MG: 1 INJECTION, SOLUTION INTRAMUSCULAR; INTRAVENOUS; SUBCUTANEOUS at 01:15

## 2020-12-17 RX ADMIN — TAMSULOSIN HYDROCHLORIDE 0.4 MG: 0.4 CAPSULE ORAL at 08:10

## 2020-12-17 RX ADMIN — DOCUSATE SODIUM AND SENNOSIDES 1 TABLET: 8.6; 5 TABLET ORAL at 19:42

## 2020-12-17 RX ADMIN — OXYBUTYNIN CHLORIDE 20 MG: 5 TABLET, EXTENDED RELEASE ORAL at 11:37

## 2020-12-17 RX ADMIN — SODIUM CHLORIDE: 9 INJECTION, SOLUTION INTRAVENOUS at 11:37

## 2020-12-17 RX ADMIN — INSULIN ASPART 1 UNITS: 100 INJECTION, SOLUTION INTRAVENOUS; SUBCUTANEOUS at 19:54

## 2020-12-17 RX ADMIN — Medication 10 MG: at 15:01

## 2020-12-17 RX ADMIN — PHENYLEPHRINE HYDROCHLORIDE 100 MCG: 10 INJECTION INTRAVENOUS at 14:54

## 2020-12-17 RX ADMIN — PHENYLEPHRINE HYDROCHLORIDE 200 MCG: 10 INJECTION INTRAVENOUS at 15:06

## 2020-12-17 RX ADMIN — GLYCOPYRROLATE 0.6 MG: 0.2 INJECTION, SOLUTION INTRAMUSCULAR; INTRAVENOUS at 15:26

## 2020-12-17 RX ADMIN — GABAPENTIN 800 MG: 400 CAPSULE ORAL at 13:47

## 2020-12-17 RX ADMIN — Medication 10 MG: at 14:50

## 2020-12-17 ASSESSMENT — ENCOUNTER SYMPTOMS
WOUND: 0
CHILLS: 0
SHORTNESS OF BREATH: 0
DYSURIA: 0

## 2020-12-17 NOTE — DISCHARGE INSTRUCTIONS
Hold aspirin until stent removed in 2 weeks  Hold vitamin C - make contribute to stone formation  Hold prenatal vitamin today and tomorrow      CYSTOSCOPY DISCHARGE INSTRUCTIONS  [facility]  [address]  [phone]    YOU MAY GO BACK TO YOUR NORMAL DIET AND ACTIVITY, UNLESS YOUR DOCTOR TELLS YOU NOT TO.    FOR THE NEXT TWO DAYS, YOU MAY NOTICE:    SOME BLOOD IN YOUR URINE.  SOME BURNING WHEN YOU URINATE.  AN URGE TO URINATE MORE OFTEN.  BLADDER SPASMS.    THESE ARE NORMAL AFTER THE PROCEDURE.  THEY SHOULD GO AWAY AFTER A DAY OR TWO.  TO RELIEVE THESE PROBLEMS:     DRINK 6 TO 8 LARGE GLASSES OF WATER EACH DAY (INCLUDES DRINKS AT MEALS).  THIS WILL HELP CLEAR THE URINE.    TAKE WARM BATHS TO RELIEVE PAIN AND BLADDER SPASMS.  DO NOT ADD ANYTHING TO THE BATH WATER.    YOUR DOCTOR MAY PRESCRIBE PAIN MEDICINE.  YOU MAY ALSO TAKE TYLENOL (ACETAMINOPHEN) FOR PAIN.    CALL YOUR SURGEON IF YOU HAVE:    A FEVER OVER 101 DEGREES.  CHECK YOUR TEMPERATURE UNDER YOUR TONGUE.    CHILLS.    FAILURE TO URINATE (NO URINE COMES OUT WHEN YOU TRY TO USE THE TOILET).  TRY SOAKING IN A BATHTUB FULL OF WARM WATER.  IF STILL NO URINE, CALL YOUR DOCTOR.    A LOT OF BLOOD IN THE URINE, OR BLOOD CLOTS LARGER THAN A NICKEL.      PAIN IN THE BACK OR BELLY AREA (ABDOMEN).    PAIN OR SPASMS THAT ARE NOT RELIEVED BY WARM TUB BATHS AND PAIN MEDICINE.      SEVERE PAIN, BURNING OR OTHER PROBLEMS WHILE PASSING URINE.    PAIN THAT GETS WORSE AFTER TWO DAYS.          EXTRACORPOREAL SHOCK LITHOTRIPSY (ESWL) DISCHARGE INSTRUCTIONS  [facility]  [address] [facility] [facility]  [phone]      Your stone(s) has been fragmented into many tiny pieces, which must now pass in your urine.  Usually this process is uneventful.  Most fragments pass in the first one or two weeks, but some may continue to pass for three months or more.  Some pain or discomfort may accompany the passage of these fragments.    To aid in the  passage of fragments, drink lots of fluid.  Aim for 2 liters of water daily for the next one to two weeks. Most patients will benefit from a continued high fluid intake and urine output indefinitely, even after the fragments are gone.  This helps prevent new stone formation.    Strain your urine.  Take the stone fragments to your urologist.  They will have them analyzed to help determine the cause of your stone(s).    You should walk around and resume every day activities.  Activity may help the stone fragments pass.  You should, however, avoid sports or really strenuous exercise for about one week, or at least until there is no more blood in your urine.      Call your urologist if you have:  a. Persistent severe pain not relieved by oral medications.  b. Fever (over 101 ).  c. Persistent vomiting.    See your urologist as directed.  They will need to take an x-ray to check your progress.  Take your stone fragments to your follow-up appointment.        STENT INFORMATION/DISCHARGE INSTRUCTIONS  [facility]  [facility]  [phone]    During surgery, a stent may be placed in the ureter.  The ureter is the tube that drains urine from the kidney to the bladder.  The stent is placed to dilate (open) the ureter so stone fragments can pass easily through the ureter or to decrease ureteral swelling after surgery or to relieve an obstruction.      The stent is made of silicone.  The upper end of the stent curls in the kidney while the lower end rests in the bladder.    While the stent is in place you may experience the following symptoms:  Blood and/or small blood clots in the urine  Bladder spasms (frequency and urgency of urination)  Discomfort or aching in the back or side where the stent is  Burning or discomfort at the end of urine stream    To decrease these symptoms you should:  Take antispasmodic medication as prescribed (Detrol, Ditropan, etc.)  Drink plenty of fluids but avoid  caffeine and citrus (include cranberry)  If you are having discomfort in back or side, decrease activity    Please call your physician or the physician on call if you experience:  Fever greater than 101 degrees  Severe pain not relieved by pain medication or rest    Please make an appointment for the removal of the stent according to your physician's instructions.                GENERAL ANESTHESIA OR SEDATION ADULT DISCHARGE INSTRUCTIONS   SPECIAL PRECAUTIONS FOR 24 HOURS AFTER SURGERY    IT IS NOT UNUSUAL TO FEEL LIGHT-HEADED OR FAINT, UP TO 24 HOURS AFTER SURGERY OR WHILE TAKING PAIN MEDICATION.  IF YOU HAVE THESE SYMPTOMS; SIT FOR A FEW MINUTES BEFORE STANDING AND HAVE SOMEONE ASSIST YOU WHEN YOU GET UP TO WALK OR USE THE BATHROOM.    YOU SHOULD REST AND RELAX FOR THE NEXT 24 HOURS AND YOU MUST MAKE ARRANGEMENTS TO HAVE SOMEONE STAY WITH YOU FOR AT LEAST 24 HOURS AFTER YOUR DISCHARGE.  AVOID HAZARDOUS AND STRENUOUS ACTIVITIES.  DO NOT MAKE IMPORTANT DECISIONS FOR 24 HOURS.    DO NOT DRIVE ANY VEHICLE OR OPERATE MECHANICAL EQUIPMENT FOR 24 HOURS FOLLOWING THE END OF YOUR SURGERY.  EVEN THOUGH YOU MAY FEEL NORMAL, YOUR REACTIONS MAY BE AFFECTED BY THE MEDICATION YOU HAVE RECEIVED.    DO NOT DRINK ALCOHOLIC BEVERAGES FOR 24 HOURS FOLLOWING YOUR SURGERY.    DRINK CLEAR LIQUIDS (APPLE JUICE, GINGER ALE, 7-UP, BROTH, ETC.).  PROGRESS TO YOUR REGULAR DIET AS YOU FEEL ABLE.    YOU MAY HAVE A DRY MOUTH, A SORE THROAT, MUSCLES ACHES OR TROUBLE SLEEPING.  THESE SHOULD GO AWAY AFTER 24 HOURS.    CALL YOUR DOCTOR FOR ANY OF THE FOLLOWING:  SIGNS OF INFECTION (FEVER, GROWING TENDERNESS AT THE SURGERY SITE, A LARGE AMOUNT OF DRAINAGE OR BLEEDING, SEVERE PAIN, FOUL-SMELLING DRAINAGE, REDNESS OR SWELLING.    IT HAS BEEN OVER 8 TO 10 HOURS SINCE SURGERY AND YOU ARE STILL NOT ABLE TO URINATE (PASS WATER).

## 2020-12-17 NOTE — ANESTHESIA PREPROCEDURE EVALUATION
Anesthesia Pre-Procedure Evaluation    Patient: My Lauren   MRN: [id number] : 1970          Preoperative Diagnosis: Right ureteral calculus [N20.1]    Procedure(s):  CYSTOURETEROSCOPY, WITH LITHOTRIPSY USING LASER AND  right URETERAL STENT INSERTION    Past Medical History:   Diagnosis Date     Rheumatoid arthritis (H)     Rheumatoid arthritis hands, feet, and back     Past Surgical History:   Procedure Laterality Date     CHOLECYSTECTOMY      ,[facility]     LAPAROSCOPIC TUBAL LIGATION      ,[facility]     OTHER SURGICAL HISTORY      2/10,,OTHER,Rectal bleeding, hospitalized [facility]     Anesthesia Evaluation     . Pt has had prior anesthetic. Type: General    No history of anesthetic complications          ROS/MED HX    ENT/Pulmonary:     (+)ESTELLE risk factors hypertension, obese, , . .    Neurologic:  - neg neurologic ROS     Cardiovascular:     (+) hypertension----. : . . . :. .      (-) CAD, taking anticoagulants/antiplatelets and syncope   METS/Exercise Tolerance:     Hematologic:  - neg hematologic  ROS       Musculoskeletal:   (+) arthritis,  -       GI/Hepatic:     (+) GERD Symptomatic,       Renal/Genitourinary:     (+) Nephrolithiasis ,       Endo:     (+) Obesity, .      Psychiatric:  - neg psychiatric ROS       Infectious Disease:  - neg infectious disease ROS       Malignancy:      - no malignancy   Other:                          Physical Exam  Normal systems: cardiovascular, pulmonary and dental    Airway   Mallampati: II  TM distance: >3 FB  Neck ROM: full    Dental     Cardiovascular       Pulmonary             Lab Results   Component Value Date    WBC 7.7 2020    HGB 15.1 2020    HCT 44.9 2020     2020    SED 10 2015     2020    POTASSIUM 4.4 2020    CHLORIDE 109 2020    CO2 30 2020    BUN 15 2020    CR 0.72 2020     (H) 2020    YESI 8.6 2020    ALBUMIN 3.4 2020     "PROTTOTAL 7.7 12/16/2020     (H) 12/16/2020     (H) 12/16/2020    ALKPHOS 132 12/16/2020    BILITOTAL 0.3 12/16/2020    LIPASE 90 12/16/2020    INR 1.14 12/17/2020    HCG Negative 06/18/2010    HCGS Negative 05/22/2018       Preop Vitals  BP Readings from Last 3 Encounters:   12/17/20 100/62   05/26/18 114/79   05/22/18 103/61    Pulse Readings from Last 3 Encounters:   12/17/20 75   05/22/18 109   12/21/15 100      Resp Readings from Last 3 Encounters:   12/17/20 16   05/26/18 16   05/22/18 18    SpO2 Readings from Last 3 Encounters:   12/17/20 97%   05/26/18 97%   05/22/18 98%      Temp Readings from Last 1 Encounters:   12/17/20 96.4  F (35.8  C) (Temporal)    Ht Readings from Last 1 Encounters:   12/17/20 1.575 m (5' 2\")      Wt Readings from Last 1 Encounters:   12/17/20 92.2 kg (203 lb 3.2 oz)    Estimated body mass index is 37.17 kg/m  as calculated from the following:    Height as of this encounter: 1.575 m (5' 2\").    Weight as of this encounter: 92.2 kg (203 lb 3.2 oz).       Anesthesia Plan      History & Physical Review  History and physical reviewed and following examination; no interval change.    ASA Status:  2 .    NPO Status:  > 8 hours    Plan for General with Intravenous induction. Maintenance will be Inhalation.    PONV prophylaxis:  Ondansetron (or other 5HT-3) and Dexamethasone or Solumedrol         Postoperative Care  Postoperative pain management:  IV analgesics.      Consents  Anesthetic plan, risks, benefits and alternatives discussed with:  Patient..                 Reji Robledo MD                    .  "

## 2020-12-17 NOTE — BRIEF OP NOTE
Diagnosis: 8 mm right ureteral calculus  Procedure: Video cystoscopy, right ureteroscopy with holmium laser lithotripsy, stone extraction, right double-J stent (5 Serbian by 24 cm)  Surgeon: Jessi  Anesthesia: General laryngeal mask  EBL: 2 ml  Findings: Large stone right mid ureter-broken up with holmium laser at 6 W.  Stone material sent for analysis.  Cystoscopy stent removal in 2 weeks, KUB in 6 months

## 2020-12-17 NOTE — PLAN OF CARE
PRIMARY DIAGNOSIS: ACUTE RENAL COLIC    OUTPATIENT/OBSERVATION GOALS TO BE MET BEFORE DISCHARGE  1. Pain Status: No improvement noted. Consider adjustment in pain regimen.    2. Tolerating adequate PO diet: No    3. Surgical Intervention planned: No    4. Cleared by consultants (if involved): No    5. Return to near baseline physical activity: Yes    Discharge Planner Nurse   Safe discharge environment identified: Yes  Barriers to discharge: No       Entered by: Winifred Villasenor 12/17/2020 8:28 AM     Please review provider order for any additional goals.   Nurse to notify provider when observation goals have been met and patient is ready for discharge.    BP 89/63, recheck 114/74. IV dilaudid and oxycodone for pain, patient tearful during intermittent sharp lower abdominal pain, no improvement noted. NPO for urology consult. IVF infusing. Voiding, urine strained. Will continue to monitor.

## 2020-12-17 NOTE — CONSULTS
The patient is a 50-year-old female with a 6 mm stone in the mid right ureter on CT scan.  She has severe right flank pain and is being scheduled for cystoscopy, ureteroscopy with holmium laser lithotripsy and stone extraction this afternoon.  She may need a right double-J stent as well.  Other past medical history: Hypertension, hyperlipidemia, PTSD, rheumatoid arthritis, anemia, insomnia cholecystectomy, hysterectomy, tubal ligation  Allergies: Codeine, sulfa  Home medications: Baby aspirin, Lipitor, Neurontin, Provera, Metformin, Toprol, Ditropan, Zantac  Laboratory studies: Normal electrolytes, creatinine and serum calcium.  Hemoglobin A1c 7.0, glucose 123 this morning.  Normal white blood cell count on admission, hemoglobin and platelets normal.  Negative Covid test.  Urinalysis shows a pH of 6.0, specific gravity 1.030, 21 white blood cells per high-powered field with negative esterase and negative nitrite, greater than 182 red blood cells per high-powered field.  CT scan shows no other stones.  Assessment: Mid right ureteral stone with severe right flank pain  Plan: N.p.o.  Cystoscopy with right ureteroscopy and stone extraction, holmium laser lithotripsy and possible double-J stent.  This was scheduled for 3:55 PM

## 2020-12-17 NOTE — PHARMACY-ADMISSION MEDICATION HISTORY
Admission medication history interview status for this patient is complete. See [facility] admission navigator for allergy information, prior to admission medications and immunization status.     Medication history interview done via telephone during Covid-19 pandemic, indicate source(s): Patient  Medication history resources (including written lists, pill bottles, clinic record):epic/sure script - called [facility] pharmacy  Pharmacy: multiple    Changes made to PTA medication list:  Added: Abilify, Wellbutrin, clonazepam, Vyvanse, vitamin C, prenatal vitamin,  Deleted: Zantac, Provera  Changed: gabapentin, metoprolol, ditropan, metformin    Actions taken by pharmacist (provider contacted, etc):sepideh elizabeth, blanca note     Additional medication history information:None    Medication reconciliation/reorder completed by provider prior to medication history?  N   (Y/N)           Prior to Admission medications    Medication Sig Last Dose Taking? Auth Provider   ARIPiprazole (ABILIFY) 2 MG tablet Take 2 mg by mouth daily 12/16/2020 at Unknown time Yes Unknown, Entered By History   aspirin 81 MG tablet Take 81 mg by mouth daily 12/16/2020 at Unknown time Yes Unknown, Entered By History   atorvastatin (LIPITOR) 40 MG tablet Take 40 mg by mouth daily 12/16/2020 at Unknown time Yes Unknown, Entered By History   buPROPion (WELLBUTRIN SR) 100 MG 12 hr tablet Take 100 mg by mouth daily 12/16/2020 at Unknown time Yes Unknown, Entered By History   clonazePAM (KLONOPIN) 0.5 MG tablet Take 0.5 mg by mouth daily as needed for anxiety  Yes Unknown, Entered By History   gabapentin (NEURONTIN) 800 MG tablet Take 800 mg by mouth 3 times daily Past Week at Unknown time Yes Unknown, Entered By History   lisdexamfetamine (VYVANSE) 10 MG capsule Take 10 mg by mouth every 24 hours Takes about 1400 daily 12/16/2020 at Unknown time Yes Unknown, Entered By History   lisdexamfetamine (VYVANSE) 50 MG capsule Take 50 mg by mouth every morning 12/16/2020 at  Unknown time Yes Unknown, Entered By History   metFORMIN (GLUCOPHAGE) 1000 MG tablet Take 1,000 mg by mouth daily 12/16/2020 at Unknown time Yes Unknown, Entered By History   metoprolol succinate ER (TOPROL-XL) 50 MG 24 hr tablet Take 50 mg by mouth daily 12/16/2020 at Unknown time Yes Unknown, Entered By History   oxybutynin (DITROPAN-XL) 10 MG 24 hr tablet Take 20 mg by mouth daily  12/16/2020 at Unknown time Yes Reported, Patient   Prenatal Vit-Fe Fumarate-FA (PRENATAL MULTIVITAMIN  PLUS IRON) 27-1 MG TABS Take by mouth daily 12/16/2020 at Unknown time Yes Unknown, Entered By History   vitamin C (ASCORBIC ACID) 1000 MG TABS Take 1,000 mg by mouth daily 12/16/2020 at Unknown time Yes Unknown, Entered By History   Blood Glucose Monitoring Suppl (FIFTY50 GLUCOSE METER 2.0) W/DEVICE KIT Dispense meter, test strips, lancets covered by pt ins. .00   Reported, Patient

## 2020-12-17 NOTE — ED PROVIDER NOTES
History   Chief Complaint:  Abdominal pain     HPI   My Lauren is a 50 year old female with history of rheumatoid arthritis, anemia, and hypertension who presents with abdominal pain. The patient reports that about 1.5-2 hours ago she suddenly began having lower central abdominal pain that radiates to her bilateral back. She states that she has recently began having hard stools and she last had a normal bowel movement yesterday. She has had a prior hysterectomy with ovary removal. She also mentions some mild chest pain. The patient otherwise denies any vomiting, nausea, diarrhea, vaginal bleeding, cough, fever or trauma.       Review of Systems   Constitutional: Negative for chills and fever.   Respiratory: Negative for cough and shortness of breath.    Cardiovascular: Negative for chest pain.   Gastrointestinal: Positive for abdominal pain and constipation. Negative for diarrhea, nausea and vomiting.   Genitourinary: Negative for dysuria and vaginal bleeding.   Musculoskeletal: Positive for back pain.   Skin: Negative for rash and wound.   All other systems reviewed and are negative.      Allergies:  Codeine  Sulfa Drugs    Medications:  Aspirin 81 mg  Lipitor  Neurontin  Provera  Metformin  Toprol  Ditropan  Zantac    Past Medical History:    Rheumatoid arthritis  Anemia  Excessive or frequent menstruation  Hypertension  Insomnia  PTSD  Gallstones      Past Surgical History:    Cholecystectomy  Tubal ligation  Rectal bleeding repair   Hysterectomy     Family History:    Mother: arthritis, rheumatoid arthritis, diabetes, stomach tumor  Brother: cancer, substance abuse, arthritis,scoliosis   Sister: ovarian cancer     Social History:  Smoker, here by herself.    Physical Exam     Patient Vitals for the past 24 hrs:   BP Temp Temp src Pulse Resp SpO2 Weight   12/16/20 2156 127/83 98.1  F (36.7  C) Oral 97 16 99 % 90.7 kg (200 lb)       Physical Exam  Constitutional: Appears uncomfortable but non  toxic.  HEENT: Atraumatic.  Moist mucous membranes.  Neck: Soft.  Supple.   Cardiac: Regular rate and rhythm.  No murmur or rub.  Respiratory: Clear to auscultation bilaterally.  No respiratory distress.    Abdomen: Soft with periumbilical tenderness to palpation.  No rebound or guarding.  Nondistended.  Musculoskeletal: No edema.  Normal range of motion.  Neurologic: Alert and oriented x3.  Normal tone and bulk.   Skin: No rashes.  No edema.  Psych: Normal affect.  Normal behavior.    Emergency Department Course     Imaging:  CT Abdomen Pelvis w Contrast   Final Result   IMPRESSION:    1.  8 mm obstructing stone in the mid right ureter resulting in moderate right-sided hydronephrosis.      2.  No other acute findings.      3.  Fatty infiltration of the liver.      4.  Cholecystectomy and hysterectomy.        Laboratory:  CBC: WNL. (WBC 7.7, HGB 15.1, )   CMP: Glucose 212 (H),  (H),  (H) o/w AWNL (Creatinine 0.66)    Lipase: 90  Labs Ordered and Resulted from Time of ED Arrival Up to the Time of Departure from the ED   COMPREHENSIVE METABOLIC PANEL - Abnormal; Notable for the following components:       Result Value    Glucose 212 (*)      (*)      (*)     All other components within normal limits   ROUTINE UA WITH MICROSCOPIC - Abnormal; Notable for the following components:    Glucose Urine 70 (*)     Ketones Urine Trace (*)     Blood Urine Large (*)     Protein Albumin Urine 70 (*)     WBC Urine 21 (*)     RBC Urine >182 (*)     Bacteria Urine Few (*)     Mucous Urine Present (*)     Calcium Oxalate Few (*)     All other components within normal limits   CBC WITH PLATELETS DIFFERENTIAL   LIPASE   INFLUENZA A/B & SARS-COV2 PCR MULTIPLEX   PERIPHERAL IV CATHETER   URINE CULTURE AEROBIC BACTERIAL         Emergency Department Course:    Reviewed:  2208 I reviewed the patient's nursing notes, vitals, past medical records, Care Everywhere.     Assessments:  2211 I met with the patient  and preformed a physical examination as documented above.      Consults:       Interventions:  2217 Fentanyl 75 mcg IV   1 mg IV Dilaudid  15 mg IV Toradol  1000 mL normal saline IV    Disposition:  The patient was admitted to the hospital under the care of Dr. Barrera.       Impression & Plan     Medical Decision Making:  My Lauren is a 51 yo woman who is afebrile and hemodynamically stable.  She is quite uncomfortable appearing.  Abdomen is soft without acute peritoneal signs.  A CT scan of the abdomen and pelvis reveals a 8 mm right-sided ureteral stone, likely the source of her pain.  She has no evidence of acute kidney injury.  Her urine does not appear obviously infected.  She has no fever or leukocytosis.  She is not vomiting.  She continues to have significant pain despite the above interventions.  We discussed the options today and she is agreeable to an observation admission overnight for pain control and likely urology consult in the morning.  I spoke with hospitalist service who accepts the patient to the observation unit and she is in stable condition awaiting transport to the floor.    Diagnosis:    ICD-10-CM    1. Kidney stone  N20.0        Scribe Disclosure:  Daniel COFFEY, am serving as a scribe at 9:59 PM on 12/16/2020 to document services personally performed by Tra Castro MD based on my observations and the provider's statements to me.            Tra Castro MD  12/17/20 0054

## 2020-12-17 NOTE — PLAN OF CARE
PRIMARY DIAGNOSIS: ACUTE RENAL COLIC    OUTPATIENT/OBSERVATION GOALS TO BE MET BEFORE DISCHARGE  1. Pain Status: No improvement noted. Consider adjustment in pain regimen.    2. Tolerating adequate PO diet: No    3. Surgical Intervention planned: Yes    4. Cleared by consultants (if involved): No    5. Return to near baseline physical activity: Yes    Discharge Planner Nurse   Safe discharge environment identified: Yes  Barriers to discharge: No       Entered by: Winifred Villasenor 12/17/2020 11:26 AM     Please review provider order for any additional goals.   Nurse to notify provider when observation goals have been met and patient is ready for discharge.    BP 89/63, recheck 114/74 and 108/69. IV dilaudid, oxycodone, and IV toradol for pain, patient tearful during intermittent sharp lower abdominal pain, no improvement noted. NPO for urology consult - plan for surgery at 1545. IVF infusing. Voiding, urine strained. Will continue to monitor.

## 2020-12-17 NOTE — PLAN OF CARE
Came to floor rating pain near umbilicus radiating to back as 7. Medicated with Dilaudid and pain decreased to 4. Vomited several times. Zofran given once. Has been NPO. No difficulty urinating. Urine strain with no stone noted. Able to sleep. Urology to see. Due for AM lab draw.

## 2020-12-17 NOTE — ANESTHESIA CARE TRANSFER NOTE
Patient: My Lauren    Procedure(s):  CYSTOURETEROSCOPY, WITH LITHOTRIPSY USING LASER AND  right URETERAL STENT INSERTION    Diagnosis: Right ureteral calculus [N20.1]  Diagnosis Additional Information: No value filed.    Anesthesia Type:   General     Note:  Airway :Face Mask  Patient transferred to:PACU  Handoff Report: Identifed the Patient, Identified the Reponsible Provider, Reviewed the pertinent medical history, Discussed the surgical course, Reviewed Intra-OP anesthesia mangement and issues during anesthesia, Set expectations for post-procedure period and Allowed opportunity for questions and acknowledgement of understanding      Vitals: (Last set prior to Anesthesia Care Transfer)    CRNA VITALS  12/17/2020 1501 - 12/17/2020 1536      12/17/2020             Pulse:  98    SpO2:  97 %                Electronically Signed By: MARTI Sanchez CRNA  December 17, 2020  3:36 PM

## 2020-12-17 NOTE — ANESTHESIA PROCEDURE NOTES
Airway   Date/Time: 12/17/2020 2:51 PM   Patient location during procedure: OR    Staff -   Performed By: CRNA    Indications and Patient Condition  Indications for airway management: john-procedural  Induction type:intravenousMask difficulty assessment: 1 - vent by mask    Final Airway Details  Final airway type: endotracheal airway  Successful airway:ETT - single  Endotracheal Airway Details   ETT size (mm): 7.0  Cuffed: yes  Successful intubation technique: direct laryngoscopy  Grade View of Cords: 1  Adjucts: stylet  Measured from: gums/teeth  Secured at (cm): 23  Secured with: plastic tape  Bite block used: Oral Airway    Post intubation assessment   Placement verified by: capnometry, equal breath sounds and chest rise   Number of attempts at approach: 1  Secured with:plastic tape  Ease of procedure: easy  Dentition: Intact and Unchanged

## 2020-12-17 NOTE — DISCHARGE SUMMARY
[facility] Discharge Summary    My Lauren MRN# [id number]   Age: 50 year old YOB: 1970     Date of Admission:  12/16/2020  Date of Discharge::  12/18/2020  Admitting Physician:  Matt Barrera MD  Discharge Physician:  Sean Barakat MD     Home clinic: [facility]          Admission Diagnoses:   Kidney stone [N20.0]          Discharge Diagnosis:   Principal Problem:    Right ureteral calculus  Active Problems:    Kidney stone            Procedures:   Video cystoscopy, right ureteroscopy with holmium laser lithotripsy, stone extraction, right double-J stent (5 French by 24 cm)          Medications Prior to Admission:     Medications Prior to Admission   Medication Sig Dispense Refill Last Dose     ARIPiprazole (ABILIFY) 2 MG tablet Take 2 mg by mouth daily   12/16/2020 at Unknown time     atorvastatin (LIPITOR) 40 MG tablet Take 40 mg by mouth daily   12/16/2020 at Unknown time     buPROPion (WELLBUTRIN SR) 100 MG 12 hr tablet Take 100 mg by mouth daily   12/16/2020 at Unknown time     clonazePAM (KLONOPIN) 0.5 MG tablet Take 0.5 mg by mouth daily as needed for anxiety        gabapentin (NEURONTIN) 800 MG tablet Take 800 mg by mouth 3 times daily   Past Week at Unknown time     lisdexamfetamine (VYVANSE) 10 MG capsule Take 10 mg by mouth every 24 hours Takes about 1400 daily   12/16/2020 at Unknown time     lisdexamfetamine (VYVANSE) 50 MG capsule Take 50 mg by mouth every morning   12/16/2020 at Unknown time     metFORMIN (GLUCOPHAGE) 1000 MG tablet Take 1,000 mg by mouth daily   12/16/2020 at Unknown time     metoprolol succinate ER (TOPROL-XL) 50 MG 24 hr tablet Take 50 mg by mouth daily   12/16/2020 at Unknown time     oxybutynin (DITROPAN-XL) 10 MG 24 hr tablet Take 20 mg by mouth daily    12/16/2020 at Unknown time     Blood Glucose Monitoring Suppl (FIFTY50 GLUCOSE METER 2.0) W/DEVICE KIT Dispense meter, test strips, lancets covered by pt ins.  .00        [DISCONTINUED] aspirin 81 MG tablet Take 81 mg by mouth daily   12/16/2020 at Unknown time     [DISCONTINUED] Prenatal Vit-Fe Fumarate-FA (PRENATAL MULTIVITAMIN  PLUS IRON) 27-1 MG TABS Take by mouth daily   12/16/2020 at Unknown time     [DISCONTINUED] vitamin C (ASCORBIC ACID) 1000 MG TABS Take 1,000 mg by mouth daily   12/16/2020 at Unknown time             Discharge Medications:     Current Discharge Medication List      START taking these medications    Details   ciprofloxacin (CIPRO) 500 MG tablet Take 1 tablet (500 mg) by mouth every 12 hours Take with evening food today and with breakfast in am  Qty: 2 tablet, Refills: 0    Associated Diagnoses: Right ureteral calculus         CONTINUE these medications which have NOT CHANGED    Details   ARIPiprazole (ABILIFY) 2 MG tablet Take 2 mg by mouth daily      atorvastatin (LIPITOR) 40 MG tablet Take 40 mg by mouth daily      buPROPion (WELLBUTRIN SR) 100 MG 12 hr tablet Take 100 mg by mouth daily      clonazePAM (KLONOPIN) 0.5 MG tablet Take 0.5 mg by mouth daily as needed for anxiety      gabapentin (NEURONTIN) 800 MG tablet Take 800 mg by mouth 3 times daily      !! lisdexamfetamine (VYVANSE) 10 MG capsule Take 10 mg by mouth every 24 hours Takes about 1400 daily      !! lisdexamfetamine (VYVANSE) 50 MG capsule Take 50 mg by mouth every morning      metFORMIN (GLUCOPHAGE) 1000 MG tablet Take 1,000 mg by mouth daily      metoprolol succinate ER (TOPROL-XL) 50 MG 24 hr tablet Take 50 mg by mouth daily      oxybutynin (DITROPAN-XL) 10 MG 24 hr tablet Take 20 mg by mouth daily       Blood Glucose Monitoring Suppl (FIFTY50 GLUCOSE METER 2.0) W/DEVICE KIT Dispense meter, test strips, lancets covered by pt ins. .00       !! - Potential duplicate medications found. Please discuss with provider.      STOP taking these medications       aspirin 81 MG tablet Comments:   Reason for Stopping:         Prenatal Vit-Fe Fumarate-FA (PRENATAL MULTIVITAMIN   "PLUS IRON) 27-1 MG TABS Comments:   Reason for Stopping:         vitamin C (ASCORBIC ACID) 1000 MG TABS Comments:   Reason for Stopping:                     Consultations:   Consultation during this admission received from urology          Hospital Course:   My Lauren is a 50-year-old woman who came to the emergency department in the evening on 12/16/2020 with complaint of lower abdominal pain.  Please see notes by Deyvi Castro and Holly for details of the presentation, emergency room department course and initial admission.  Ultimately, Ms. Ventura was found to have an 8 mm right ureteral stone that was causing moderate right-sided hydronephrosis.  She was admitted to medical bed in anticipation of urologic intervention.  Notably, the patient did not have findings compatible with infection.    Ms. Lauren was seen by Dr. Elfego Bowen from [facility] urology and plans were made for her to be taken to the operating room.  She underwent cystoscopy with stent placement and removal of the obstructing stone.  This was well-tolerated.  I saw the patient briefly in the post anesthesia area and was asked to discharge her home from there.    /67   Pulse 71   Temp 96.8  F (36  C) (Temporal)   Resp 14   Ht 1.575 m (5' 2\")   Wt 92.2 kg (203 lb 3.2 oz)   SpO2 100%   BMI 37.17 kg/m    At the time of my evaluation, Ms. Lauren is calm but perhaps oversedated.  She responds but is calmly resting.  Chest: Clear to auscultation anteriorly.  Work of breathing is normal.  Heart: Regular rate and rhythm without rubs, murmurs or gallops.    Unfortunately, Ms. Lauren remains sedated on the evening after surgery and remained overnight.    /57   Pulse 90   Temp 98.5  F (36.9  C) (Oral)   Resp 16   Ht 1.575 m (5' 2\")   Wt 92.2 kg (203 lb 3.2 oz)   SpO2 96%   BMI 37.17 kg/m    On the morning of discharge, Ms. Lauren was alert, eating breakfast.  Indicated that her pain was dramatically resolved.  She is " breathing comfortably and is in no apparent distress.            Discharge Instructions and Follow-Up:   Discharge diet: Regular   Discharge activity: Activity as tolerated   Discharge follow-up: Follow up with urology for cystoscopy and stent removal in 2 weeks           Discharge Disposition:   Discharged to home      Attestation:  I have reviewed today's vital signs, notes, medications, labs and imaging.  Total time: 15 minutes    Sean Barakat MD

## 2020-12-17 NOTE — ED TRIAGE NOTES
Pt notes she has been constipated for 2 days and now having lower abd pain which she describes as wave like. She notes no change to urinataion

## 2020-12-17 NOTE — ED NOTES
[facility]  ED Nurse Handoff Report    My Lauren is a 50 year old female   ED Chief complaint: Abdominal Pain  . ED Diagnosis:   Final diagnoses:   None     Allergies:   Allergies   Allergen Reactions     Codeine Hives     Sulfa Drugs Hives       Code Status: Full Code  Activity level - Baseline/Home:  Independent. Activity Level - Current:   Stand by Assist. Lift room needed: No. Bariatric: No   Needed: No   Isolation: No. Infection: Not Applicable.     Vital Signs:   Vitals:    12/16/20 2156 12/16/20 2300 12/16/20 2315 12/16/20 2330   BP: 127/83 (!) 147/90     Pulse: 97 74     Resp: 16 20     Temp: 98.1  F (36.7  C)      TempSrc: Oral      SpO2: 99% 99% 97% 98%   Weight: 90.7 kg (200 lb)          Cardiac Rhythm:  ,      Pain level: 0-10 Pain Scale: 5  Patient confused: No. Patient Falls Risk: No.   Elimination Status: Has voided   Patient Report - Initial Complaint: Pt notes she has been constipated for 2 days and now having lower abd pain which she describes as wave like. She notes no change to urinataion  . Focused Assessment:  My Lauren is a 50 year old female with history of rheumatoid arthritis, anemia, and hypertension who presents with abdominal pain. The patient reports that about 1.5-2 hours ago she suddenly began having lower central abdominal pain that radiates to her bilateral back. She states that she has recently began having hard stools and she last had a normal bowel movement yesterday. She has had a prior hysterectomy with ovary removal. She also mentions some mild chest pain. The patient otherwise denies any vomiting, nausea, diarrhea, vaginal bleeding, cough, fever or trauma.   Tests Performed:   Labs Ordered and Resulted from Time of ED Arrival Up to the Time of Departure from the ED   COMPREHENSIVE METABOLIC PANEL - Abnormal; Notable for the following components:       Result Value    Glucose 212 (*)      (*)      (*)     All other  components within normal limits   ROUTINE UA WITH MICROSCOPIC - Abnormal; Notable for the following components:    Glucose Urine 70 (*)     Ketones Urine Trace (*)     Blood Urine Large (*)     Protein Albumin Urine 70 (*)     WBC Urine 21 (*)     RBC Urine >182 (*)     Bacteria Urine Few (*)     Mucous Urine Present (*)     Calcium Oxalate Few (*)     All other components within normal limits   CBC WITH PLATELETS DIFFERENTIAL   LIPASE   PERIPHERAL IV CATHETER     . Abnormal Results:   CT Abdomen Pelvis w Contrast   Final Result   IMPRESSION:    1.  8 mm obstructing stone in the mid right ureter resulting in moderate right-sided hydronephrosis.      2.  No other acute findings.      3.  Fatty infiltration of the liver.      4.  Cholecystectomy and hysterectomy.          Treatments provided: see MAR  Family Comments: pt via her phone  OBS brochure/video discussed/provided to patient:  Yes  ED Medications:   Medications   0.9% sodium chloride BOLUS (1,000 mLs Intravenous New Bag 12/16/20 2309)   fentaNYL (PF) (SUBLIMAZE) injection 75 mcg (75 mcg Intravenous Given 12/16/20 2217)   sodium chloride (PF) 0.9% PF flush 100 mL (65 mLs Intravenous Given 12/16/20 2242)   iopamidol (ISOVUE-370) solution 500 mL (100 mLs Intravenous Given 12/16/20 2242)   ketorolac (TORADOL) injection 15 mg (15 mg Intravenous Given 12/16/20 2306)   HYDROmorphone (DILAUDID) injection 1 mg (1 mg Intravenous Given 12/16/20 2329)     Drips infusing:  No  For the majority of the shift, the patient's behavior Green. Interventions performed were none.    Sepsis treatment initiated: No     Patient tested for COVID 19 prior to admission: YES    ED Nurse Name/Phone Number: Patty Valiente RN,   11:46 PM    RECEIVING UNIT ED HANDOFF REVIEW    Above ED Nurse Handoff Report was reviewed: Yes  Reviewed by: Sarita Figueredo RN on December 17, 2020 at 1:12 AM

## 2020-12-17 NOTE — H&P
"Admitted:     12/16/2020      PRIMARY CARE PHYSICIAN:  None listed.       CHIEF COMPLAINT:  Right lower quadrant pain.  The patient found to have a right ureteral stone on imaging this evening.      HISTORY OF PRESENT ILLNESS:  Ms. Lauren is a 50-year-old female with a history of rheumatoid arthritis, hypertension, and previous hysterectomy.  She presented to this evening for concerns about right lower quadrant abdominal pain.  Pain started abruptly at 9:00 p.m. this evening.  She has never had pain like this previously.  She had no fevers or chills.  She had some \"pressure\" with urinating, but no rodney dysuria.  No hematuria.  She thought perhaps she was constipated, but as her symptoms persisted and worsened she came to the ER for evaluation.      On arrival to the ER, vital signs included blood pressure 127/83 with a heart rate of 97.  No fever.  Saturation 99% on room air.  Workup there included labs and imaging.  CBC normal.  Lipase normal.  Complete metabolic panel notable for  and AST of 120.  Urinalysis shows greater than 182 red blood cells and 21 white blood cells with no nitrites or leukocyte esterase.  Urine culture was performed and is pending.  COVID-19 is pending.  Abdominal pelvic CT scan with contrast was done showing an 8 mm obstructing stone in the right mid ureter, resulting in moderate right-sided hydronephrosis.  Appendix was normal.      Diffuse fatty infiltration of the liver noted with previous cholecystectomy.      I spoke to the ER provider.  Plan is for admission to the hospital with Urology consultation.      PAST MEDICAL HISTORY:   1.  Rheumatoid arthritis.  She reports she takes gabapentin for this.  It does not sound like she is on immunosuppressive medications, but details not clear here yet until pharmacy reconciliation.   2.  Hypertension for which she takes metoprolol.   3.  PTSD.   4.  Cholecystectomy.   5.  Hysterectomy.   6.  Hyperlipidemia.      CURRENT MEDICATIONS:  " Await pharmacy reconciliation medication list.      ALLERGIES:  CODEINE CAUSES HIVES, AND SULFA CAUSES HIVES.      FAMILY HISTORY:  Reviewed.  Nothing contributory to this admission.      SOCIAL HISTORY:  The patient smokes 4 cigarettes per day.  She is currently taking Wellbutrin and cutting down on her cigarette use.  Social alcohol use.      REVIEW OF SYSTEMS:  See HPI for details.  Comprehensive greater than 10-point review of systems is otherwise negative besides that detailed above.      PHYSICAL EXAMINATION:   VITAL SIGNS:  Blood pressure is currently 145/90 with a heart rate of 80.  No fever.  Saturation 93% on room air.   GENERAL:  The patient appears nontoxic and in no acute distress.  Appears awake, alert and oriented x 3.  Mood and affect are normal and appropriate.   HEENT:  Head is atraumatic.  Sclerae are white.  Eyelids are normal.  Conjunctivae are normal.  Extraocular movements are intact.   NECK:  Supple.  No cervical or supraclavicular lymphadenopathy.   HEART:  Regular rate and rhythm.  No significant murmurs.  No lower extremity edema.   LUNGS:  Clear to auscultation bilaterally.  No intercostal retractions.  No conversational dyspnea.   ABDOMEN:  Notable for tenderness to palpation in the suprapubic and right lower quadrant regions.  Hypoactive bowel sounds present.  No organomegaly.  No rebound.  No guarding.   EXTREMITIES:  No edema.   SKIN:  Reveals no rashes.  No jaundice.  Skin is dry to touch.  She does have multiple tattoos.   NEUROLOGIC:  Cranial nerves II-XII are intact.  Moves all extremities appropriately.  Sensation intact to light touch in upper and lower extremities bilaterally.   PSYCHIATRIC:  Awake, alert and oriented x 3.  Mood and affect are normal and appropriate.      LABORATORY AND IMAGING DATA:  Reviewed above in HPI.      IMPRESSION:  Ms. Lauren is a 50-year-old female with no known history of renal stones previously.  She presented to the hospital this evening for  concerns about acute onset of right lower quadrant pain.  Workup in the ER included labs and imaging with CT scan notable for 8 mm obstructing right ureteral stone with hydronephrosis.  No evidence of UTI.   1.  Renal colic secondary to right ureteral stone.  Given size of stone unlikely this will pass spontaneously.   2.  Moderate elevations of AST and ALT:  No recent labs to compare to.  May be due to fatty liver seen on imaging this evening.  Does not endorse significant alcohol use.  Consider recheck with primary MD as outpatient.   2.  Hypertension history.   3.  Previous cholecystectomy.   4.  Previous hysterectomy.   5.  Rheumatoid arthritis history, does not appear to be anemic immunosuppressives, although await pharmacy reconciliation of medications to confirm this.      PLAN:   1.  Admit to observation.   2.  Urology consult for consideration of ureteral stent.   3.  N.p.o.   4.  Strain urine.   5.  We will administer Flomax on the off chance that the stone passes on its own, but seems unlikely given size.   6.  IV fluids overnight.   7.  Likely discharge after ureteral stent placement later today.         CATRACHO ALCOCER MD             D: 2020   T: 2020   MT: TWYLA      Name:     JOHN PACE   MRN:      [id number]        Account:      [id number]   :      1970        Admitted:     2020                   Document: [id number]

## 2020-12-17 NOTE — PROGRESS NOTES
Care Management Follow Up    Length of Stay (days): 0    Expected Discharge Date: 12/17/20     Concerns to be Addressed: None      Patient plan of care discussed at interdisciplinary rounds: Yes    Private pay costs discussed: Not applicable    Additional Information:  Writer spoke with the patient regarding no PCP. Patient states that she sees Doctor Antoni Isabel at [facility] in [place], [place]. Writer unable to find the doctors name, but updated the face sheet with the current clinic. The patient had no further concerns or questions.      Winifred Ball   Nursing Student

## 2020-12-17 NOTE — ANESTHESIA POSTPROCEDURE EVALUATION
Patient: My Lauren    Procedure(s):  CYSTOURETEROSCOPY, WITH LITHOTRIPSY USING LASER AND  right URETERAL STENT INSERTION    Diagnosis:Right ureteral calculus [N20.1]  Diagnosis Additional Information: No value filed.    Anesthesia Type:  General    Note:  Anesthesia Post Evaluation    Patient location during evaluation: PACU  Patient participation: Able to fully participate in evaluation  Level of consciousness: awake and alert  Pain management: adequate  Airway patency: patent  Cardiovascular status: acceptable  Respiratory status: acceptable  Hydration status: acceptable  PONV: controlled             Last vitals:  Vitals:    12/17/20 1540 12/17/20 1545 12/17/20 1550   BP: 104/66 103/66 100/67   Pulse: 78 73 71   Resp: 12 13 14   Temp:      SpO2: 100% 100% 100%         Electronically Signed By: Reji Robledo MD  December 17, 2020  4:15 PM

## 2020-12-18 VITALS
OXYGEN SATURATION: 96 % | SYSTOLIC BLOOD PRESSURE: 101 MMHG | DIASTOLIC BLOOD PRESSURE: 57 MMHG | TEMPERATURE: 98.5 F | RESPIRATION RATE: 16 BRPM | WEIGHT: 203.2 LBS | BODY MASS INDEX: 37.39 KG/M2 | HEART RATE: 90 BPM | HEIGHT: 62 IN

## 2020-12-18 LAB
BACTERIA SPEC CULT: NORMAL
COPATH REPORT: NORMAL
GLUCOSE BLDC GLUCOMTR-MCNC: 142 MG/DL (ref 70–99)
GLUCOSE BLDC GLUCOMTR-MCNC: 199 MG/DL (ref 70–99)
INTERPRETATION ECG - MUSE: NORMAL
Lab: NORMAL
SPECIMEN SOURCE: NORMAL

## 2020-12-18 PROCEDURE — 258N000003 HC RX IP 258 OP 636: Performed by: INTERNAL MEDICINE

## 2020-12-18 PROCEDURE — 96376 TX/PRO/DX INJ SAME DRUG ADON: CPT

## 2020-12-18 PROCEDURE — 250N000013 HC RX MED GY IP 250 OP 250 PS 637: Performed by: INTERNAL MEDICINE

## 2020-12-18 PROCEDURE — 99217 PR OBSERVATION CARE DISCHARGE: CPT | Performed by: INTERNAL MEDICINE

## 2020-12-18 PROCEDURE — 999N001017 HC STATISTIC GLUCOSE BY METER IP

## 2020-12-18 PROCEDURE — G0378 HOSPITAL OBSERVATION PER HR: HCPCS

## 2020-12-18 PROCEDURE — 96372 THER/PROPH/DIAG INJ SC/IM: CPT

## 2020-12-18 PROCEDURE — 250N000011 HC RX IP 250 OP 636: Performed by: STUDENT IN AN ORGANIZED HEALTH CARE EDUCATION/TRAINING PROGRAM

## 2020-12-18 RX ADMIN — GABAPENTIN 800 MG: 400 CAPSULE ORAL at 09:24

## 2020-12-18 RX ADMIN — KETOROLAC TROMETHAMINE 30 MG: 30 INJECTION, SOLUTION INTRAMUSCULAR at 07:02

## 2020-12-18 RX ADMIN — KETOROLAC TROMETHAMINE 30 MG: 30 INJECTION, SOLUTION INTRAMUSCULAR at 01:28

## 2020-12-18 RX ADMIN — OXYBUTYNIN CHLORIDE 20 MG: 5 TABLET, EXTENDED RELEASE ORAL at 09:24

## 2020-12-18 RX ADMIN — SODIUM CHLORIDE: 9 INJECTION, SOLUTION INTRAVENOUS at 01:28

## 2020-12-18 RX ADMIN — ATORVASTATIN CALCIUM 40 MG: 40 TABLET, FILM COATED ORAL at 09:25

## 2020-12-18 RX ADMIN — INSULIN ASPART 1 UNITS: 100 INJECTION, SOLUTION INTRAVENOUS; SUBCUTANEOUS at 09:27

## 2020-12-18 RX ADMIN — DOCUSATE SODIUM AND SENNOSIDES 1 TABLET: 8.6; 5 TABLET ORAL at 09:28

## 2020-12-18 RX ADMIN — TAMSULOSIN HYDROCHLORIDE 0.4 MG: 0.4 CAPSULE ORAL at 09:28

## 2020-12-18 RX ADMIN — ARIPIPRAZOLE 2 MG: 2 TABLET ORAL at 09:24

## 2020-12-18 RX ADMIN — BUPROPION HYDROCHLORIDE 100 MG: 100 TABLET, FILM COATED, EXTENDED RELEASE ORAL at 09:24

## 2020-12-18 NOTE — PLAN OF CARE
Orientation:A&ox3  Vss afebrile.  02: ra 93%  LS:clear  GI:bs+. Tolerated mod cho diet.  : voiding with out problems  Activity: up with sba of 1.  Pain: denies pain  Plan:pt discharge to home, waiting for pt's ride. Pt understood all instructions, meds, f/U appts needed and s/s of when to call the dr.  1 meds filled at [facility] and given to pt.     1112: pt left via w/c. All belongings taken with pt.

## 2020-12-18 NOTE — PLAN OF CARE
Vital Signs: VSS, currently on RA  Pain/Comfort: rating pain 7/10, toradol given. In a deep sleep throughout night.   Assessment: WDL   Diet: regular, diabetic. Tolerating well  Output: voiding per pt report  Activity/Ambulation: up with SBA  Plan: Will continue to monitor and provide supportive cares as needed

## 2020-12-20 NOTE — OP NOTE
Procedure Date: 12/17/2020      PREOPERATIVE DIAGNOSIS:  An 8 mm right ureteral calculus.      POSTOPERATIVE DIAGNOSIS:  An 8 mm right ureteral calculus.      PROCEDURE:  Video cystoscopy, right ureteroscopy with holmium laser lithotripsy, stone extraction and right double-J stent (5-Hebrew x 24 cm).      SURGEON:  Elfego Bowen MD      ANESTHESIA:  General laryngeal mask.      ESTIMATED BLOOD LOSS:  2 mL      INDICATIONS:  The patient is a 50-year-old female admitted with right flank pain and has an 8 mm stone in the right ureter.  The procedure, alternatives, risks and follow-up were carefully discussed with the patient.      DESCRIPTION OF THE PROCEDURE:  The patient received IV antibiotics preoperatively, was taken to cystoscopy and placed supine on the operating table.  After adequate general laryngeal mask anesthesia, the patient was placed in lithotomy position, and her genitalia were prepped and draped in a sterile fashion.  A #22 Hebrew Storz cystoscope with obturator was gently introduced in the bladder, and residual urine was clear.  I was able to pass a Glidewire easily up the right ureter under fluoroscopy until it curled in the right renal pelvis.  I removed the cystoscope, passed the 6.9 Hebrew ureteroscope, was able to intubate the ureteral orifice without dilation, and passed this up to the stone.  Using a 365 micron holmium laser fiber at 6 childs, I broke the stone into several pieces that were easily basketed and sent for stone analysis.  I then removed the ureteroscope, backloaded the Glidewire onto the cystoscope, passed the cystoscope into the bladder, and passed a 5-Hebrew x 24 cm Polaris stent easily over the Glidewire into good position.  The stent curled nicely in the right renal pelvis and in the bladder after the Glidewire was removed.  The bladder was emptied and the cystoscope removed.  The patient went to the recovery room in stable condition and will be discharged home on oral  antibiotic and followup with me in 2 weeks with a KUB and for stent removal.         KEEGAN ORTIZ JR, MD             D: 2020   T: 2020   MT: ANGEL      Name:     JOHN PACE   MRN:      [id number]        Account:        [id number]   :      1970           Procedure Date: 2020      Document: [id number]       cc: Reji Ortiz Jr, MD

## 2020-12-21 LAB
APPEARANCE STONE: NORMAL
COMPN STONE: NORMAL
NUMBER STONE: 5
SIZE STONE: NORMAL MM
WT STONE: 64 MG

## 2021-01-11 ENCOUNTER — OFFICE VISIT (OUTPATIENT)
Dept: UROLOGY | Facility: CLINIC | Age: 51
End: 2021-01-11
Payer: COMMERCIAL

## 2021-01-11 VITALS
OXYGEN SATURATION: 98 % | WEIGHT: 200 LBS | HEIGHT: 62 IN | HEART RATE: 80 BPM | BODY MASS INDEX: 36.8 KG/M2 | DIASTOLIC BLOOD PRESSURE: 84 MMHG | SYSTOLIC BLOOD PRESSURE: 140 MMHG

## 2021-01-11 DIAGNOSIS — N20.1 URETERAL STONE: ICD-10-CM

## 2021-01-11 DIAGNOSIS — Z79.2 PROPHYLACTIC ANTIBIOTIC: Primary | ICD-10-CM

## 2021-01-11 DIAGNOSIS — E66.01 MORBID OBESITY (H): ICD-10-CM

## 2021-01-11 PROBLEM — E11.9 DIABETES MELLITUS, TYPE 2 (H): Status: ACTIVE | Noted: 2021-01-11

## 2021-01-11 PROCEDURE — 99212 OFFICE O/P EST SF 10 MIN: CPT | Mod: 25 | Performed by: UROLOGY

## 2021-01-11 PROCEDURE — 52310 CYSTOSCOPY AND TREATMENT: CPT | Performed by: UROLOGY

## 2021-01-11 RX ORDER — CIPROFLOXACIN 500 MG/1
500 TABLET, FILM COATED ORAL ONCE
Qty: 1 TABLET | Refills: 0 | Status: SHIPPED | OUTPATIENT
Start: 2021-01-11 | End: 2021-01-11

## 2021-01-11 ASSESSMENT — PAIN SCALES - GENERAL: PAINLEVEL: MILD PAIN (2)

## 2021-01-11 ASSESSMENT — MIFFLIN-ST. JEOR: SCORE: 1480.44

## 2021-01-11 NOTE — PROGRESS NOTES
"This very pleasant 50-year-old lady had recently presented with right ureteric colic as a result of right ureteral stone.  She underwent right ureteroscopy with holmium laser lithotripsy and a stent was placed.  Stone analysis is as follows.  Calculi composed primarily of:   50% calcium oxalate dihydrate, and   50% calcium phosphate (hydroxy- and carbonate- apatite    This was her first stone.     Procedure.  Cystoscopy with right stent removal.   Surgeon.    Eder  Anesthesia.  Local anesthesia.  Description.  With the patient in the dorsolithotomy position, with the genital area prepped and draed in the customary fashion, with local anesthetic and the urethra, the flexible cystoscope was Inserted.  The urethra is unremarkable.  The bladder is entered without difficulty.  The stent was seen, grasped, and removed without difficulty.  There were no other remarkable features    Impression   the patient will be seen in follow-up on a as needed basis.  This was held for stone so I do not anticipate the need for more formal metabolic stone evaluation.  We did however have discussions about patient preventative measures which included, hydration, increasing citrate, magnesium and calcium in the diet and reducing sodium in the diet as well as trying to cut back on red meat.  I discussed this all carefully with him in detail today.  I answered all the questions.    Plan.  We will see her on a as needed basis    Time.  50 minutes spent in addition to the procedure in order to carefully review old records as this was our first meeting, reviewed pertinent labs and other studies.  Discussed the findings with the patient go over the stone analysis talk about methods of trying prevent further stone formation in the future and discussing the possibility of stone recurrence and measures that may also be taken at that time if this does occur.  \"This dictation was performed with voice recognition software and may contain errors,  " "omissions and inadvertent word substitution.\"      Time.  "

## 2021-01-11 NOTE — LETTER
1/11/2021       RE: My Lauren  [address]  [facility] [address]     Dear Colleague,    Thank you for referring your patient, My Lauren, to the [facility]. Please see a copy of my visit note below.    This very pleasant 50-year-old lady had recently presented with right ureteric colic as a result of right ureteral stone.  She underwent right ureteroscopy with holmium laser lithotripsy and a stent was placed.  Stone analysis is as follows.  Calculi composed primarily of:   50% calcium oxalate dihydrate, and   50% calcium phosphate (hydroxy- and carbonate- apatite    This was her first stone.     Procedure.  Cystoscopy with right stent removal.   Surgeon.    Eder  Anesthesia.  Local anesthesia.  Description.  With the patient in the dorsolithotomy position, with the genital area prepped and draed in the customary fashion, with local anesthetic and the urethra, the flexible cystoscope was Inserted.  The urethra is unremarkable.  The bladder is entered without difficulty.  The stent was seen, grasped, and removed without difficulty.  There were no other remarkable features    Impression   the patient will be seen in follow-up on a as needed basis.  This was held for stone so I do not anticipate the need for more formal metabolic stone evaluation.  We did however have discussions about patient preventative measures which included, hydration, increasing citrate, magnesium and calcium in the diet and reducing sodium in the diet as well as trying to cut back on red meat.  I discussed this all carefully with him in detail today.  I answered all the questions.    Plan.  We will see her on a as needed basis    Time.  50 minutes spent in addition to the procedure in order to carefully review old records as this was our first meeting, reviewed pertinent labs and other studies.  Discussed the findings with the patient go over  "the stone analysis talk about methods of trying prevent further stone formation in the future and discussing the possibility of stone recurrence and measures that may also be taken at that time if this does occur.  \"This dictation was performed with voice recognition software and may contain errors,  omissions and inadvertent word substitution.\"      Time.    Again, thank you for allowing me to participate in the care of your patient.      Sincerely,    Cosmo Gaines MD      "

## 2021-01-11 NOTE — PATIENT INSTRUCTIONS
"AFTER YOUR CYSTOSCOPY  ?  ?  You have just completed a cystoscopy, or \"cysto\", which allowed your physician to learn more about your bladder (or to remove a stent placed after surgery). We suggest that you continue to avoid caffeine, fruit juice, and alcohol for the next 24 hours, however, you are encouraged to return to your normal activities.  ?  ?  A few things that are considered normal after your cystoscopy:  ?  * small amount of bleeding (or spotting) that clears within the next 24 hours  ?  * slight burning sensation with urination  ?  * sensation of needing to void (urinate) more frequently  ?  * the feeling of \"air\" in your urine  ?  * mild discomfort that is relieved with Tylenol    * bladder spasms  ?  ?  ?  Please contact our office promptly if you:  ?  * develop a fever above 101 degrees  ?  * are unable to urinate  ?  * develop bright red blood that does not stop  ?  * experience severe pain or swelling  ?  ?  ?  And of course, please contact our office with any concerns or questions [phone]  ?    AFTER YOUR CYSTOSCOPY        You have just completed a cystoscopy, or \"cysto\", which allowed your physician to learn more about your bladder (or to remove a stent placed after surgery). We suggest that you continue to avoid caffeine, fruit juice, and alcohol for the next 24 hours, however, you are encouraged to return to your normal activities.         A few things that are considered normal after your cystoscopy:     * Small amount of bleeding (or spotting) that clears within the next 24 hours     * Slight burning sensation with urination     * Sensation to of needing to avoid more frequently     * The feeling of \"air\" in your urine     * Mild discomfort that is relieved with Tylenol        Please contact our office promptly if you:     * Develop a fever above 101 degrees     * Are unable to urinate     * Develop bright red blood that does not stop     * Severe pain or swelling         Please contact " our office with any concerns or questions @[facility].

## 2021-01-11 NOTE — NURSING NOTE
Chief Complaint   Patient presents with     kidney stone     in office stent removal   Prior to the start of the procedure and with procedural staff participation, I verbally confirmed the patient s identity using two indicators, relevant allergies, that the procedure was appropriate and matched the consent or emergent situation, and that the correct equipment/implants were available. Immediately prior to starting the procedure I conducted the Time Out with the procedural staff and re-confirmed the patient s name, procedure, and site/side. I have wiped the patient off with the povidone-Iodine solution, draped them,   and instilled sterile water into the bladder. (The Joint Commission universal protocol was followed.)  Yes    Sedation (Moderate or Deep): None  Valeria Olvera LPN

## 2024-04-22 ENCOUNTER — HOSPITAL ENCOUNTER (EMERGENCY)
Facility: CLINIC | Age: 54
Discharge: HOME | End: 2024-04-22
Attending: EMERGENCY MEDICINE | Admitting: EMERGENCY MEDICINE
Payer: COMMERCIAL

## 2024-04-22 ENCOUNTER — APPOINTMENT (OUTPATIENT)
Dept: GENERAL RADIOLOGY | Facility: CLINIC | Age: 54
End: 2024-04-22
Attending: EMERGENCY MEDICINE
Payer: COMMERCIAL

## 2024-04-22 VITALS
WEIGHT: 187.17 LBS | BODY MASS INDEX: 34.44 KG/M2 | HEIGHT: 62 IN | RESPIRATION RATE: 18 BRPM | HEART RATE: 94 BPM | TEMPERATURE: 98.8 F | DIASTOLIC BLOOD PRESSURE: 80 MMHG | SYSTOLIC BLOOD PRESSURE: 112 MMHG | OXYGEN SATURATION: 94 %

## 2024-04-22 DIAGNOSIS — J06.9 UPPER RESPIRATORY TRACT INFECTION, UNSPECIFIED TYPE: ICD-10-CM

## 2024-04-22 DIAGNOSIS — J98.01 ACUTE BRONCHOSPASM: ICD-10-CM

## 2024-04-22 PROCEDURE — 99284 EMERGENCY DEPT VISIT MOD MDM: CPT | Mod: 25

## 2024-04-22 PROCEDURE — 71046 X-RAY EXAM CHEST 2 VIEWS: CPT

## 2024-04-22 PROCEDURE — 250N000012 HC RX MED GY IP 250 OP 636 PS 637: Performed by: EMERGENCY MEDICINE

## 2024-04-22 RX ORDER — PREDNISONE 20 MG/1
40 TABLET ORAL DAILY
Qty: 8 TABLET | Refills: 0 | Status: SHIPPED | OUTPATIENT
Start: 2024-04-23 | End: 2024-04-27

## 2024-04-22 RX ORDER — DOXYCYCLINE 100 MG/1
100 CAPSULE ORAL 2 TIMES DAILY
Qty: 14 CAPSULE | Refills: 0 | Status: SHIPPED | OUTPATIENT
Start: 2024-04-22 | End: 2024-04-29

## 2024-04-22 RX ORDER — PREDNISONE 20 MG/1
60 TABLET ORAL ONCE
Status: COMPLETED | OUTPATIENT
Start: 2024-04-22 | End: 2024-04-22

## 2024-04-22 RX ADMIN — PREDNISONE 60 MG: 20 TABLET ORAL at 12:14

## 2024-04-22 ASSESSMENT — COLUMBIA-SUICIDE SEVERITY RATING SCALE - C-SSRS
2. HAVE YOU ACTUALLY HAD ANY THOUGHTS OF KILLING YOURSELF IN THE PAST MONTH?: NO
6. HAVE YOU EVER DONE ANYTHING, STARTED TO DO ANYTHING, OR PREPARED TO DO ANYTHING TO END YOUR LIFE?: NO
1. IN THE PAST MONTH, HAVE YOU WISHED YOU WERE DEAD OR WISHED YOU COULD GO TO SLEEP AND NOT WAKE UP?: NO

## 2024-04-22 ASSESSMENT — ACTIVITIES OF DAILY LIVING (ADL)
ADLS_ACUITY_SCORE: 42
ADLS_ACUITY_SCORE: 40

## 2024-04-22 NOTE — DISCHARGE INSTRUCTIONS
It was a pleasure taking care of you today. I hope you feel much better soon.  Your workup is consistent with bronchitis and wheezing (bronchospasm).  Take doxycycline for bronchitis.  Take prednisone and use your albuterol inhaler for wheezing.  Please follow-up with your primary care doctor in 1 week.  Return immediately for chest pain, fever, worse shortness of breath, or any other concerns.

## 2024-04-22 NOTE — ED TRIAGE NOTES
Pt here for cough x 1 week. Feels like bronchitis. Hx of similar. Quit smoking years ago. Denies hx of COPD. Denies fevers. Declines viral swab.

## 2024-04-22 NOTE — ED PROVIDER NOTES
History     Chief Complaint:  Cough, wheezing       HPI   My Lauren is a 53 year old female with history of smoking, having quit 4 months ago, who presents with increasing cough and wheezing for 1 week.  Patient states that she began to have a cough 1 week ago and has been using her albuterol rescue inhaler approximately 3 times daily.  She has not had fevers, shortness of breath, chest pain or chest tightness.  She is concerned that this may represent bronchitis and presents for further evaluation.        Independent Historian:   None    Review of External Notes: Reviewed 10/21/2022 office visit for review of past medical history    ROS:  Review of Systems    Allergies:  Codeine  Sulfa Antibiotics     Medications:    ARIPiprazole (ABILIFY) 2 MG tablet  atorvastatin (LIPITOR) 40 MG tablet  Blood Glucose Monitoring Suppl (FIFTY50 GLUCOSE METER 2.0) W/DEVICE KIT  buPROPion (WELLBUTRIN SR) 100 MG 12 hr tablet  ciprofloxacin (CIPRO) 500 MG tablet  clonazePAM (KLONOPIN) 0.5 MG tablet  gabapentin (NEURONTIN) 800 MG tablet  lisdexamfetamine (VYVANSE) 10 MG capsule  lisdexamfetamine (VYVANSE) 50 MG capsule  metFORMIN (GLUCOPHAGE) 1000 MG tablet  metoprolol succinate ER (TOPROL-XL) 50 MG 24 hr tablet  oxybutynin (DITROPAN-XL) 10 MG 24 hr tablet        Past History:    Past Medical History:   Diagnosis Date    Rheumatoid arthritis (H)          Physical Exam     Patient Vitals for the past 24 hrs:   BP Temp Temp src Pulse Resp SpO2   05/12/23 0247 113/73 97.8  F (36.6  C) Temporal 95 20 98 %        Physical Exam  Constitutional: Alert, attentive  HENT:    Nose: Nose normal.    Mouth/Throat: Oropharynx is clear, mucous membranes are moist   Eyes: EOM are normal.   CV: regular rate and rhythm; no murmurs, rubs or gallups  Chest: Effort normal, speaking full sentences, expiratory wheezing bilaterally  GI:  There is no tenderness. No distension. Normal bowel sounds  MSK: Normal range of motion.   Neurological: Alert,  attentive  Skin: Skin is warm and dry.      Emergency Department Course     No results found for any visits on 04/22/24.    Emergency Department Course & Assessments:             Interventions:  Medications   predniSONE (DELTASONE) tablet 60 mg (60 mg Oral $Given 4/22/24 1214)         Independent Interpretation (X-rays, CTs, rhythm strip):  No consolidation, widened mediastinum, or pneumothorax on chest x-ray       Disposition:  The patient was discharged to home.     Impression & Plan        Medical Decision Making:  This is a 53-year-old female with longstanding history of smoking, now in remission, as well as intermittent bronchospasm, who presents for evaluation of 1 week of increasing cough and wheezing.  Exam is consistent with acute bronchospasm, suspect possibly early COPD.  Chest x-ray shows no consolidation or other acute process.  Given longstanding smoking history, will prescribe doxycycline for this episode of URI with bronchospasm.  Recommended 5 days total of prednisone, initiated in the department.  Plan primary care recheck in 1 week and return precaution for difficulty breathing, chest pain, fever, or any other concerns.        Diagnosis:  Visit Diagnosis, Associated Orders, and Comments     ICD-10-CM    1. Acute bronchospasm  J98.01       2. Upper respiratory tract infection, unspecified type  J06.9                 Discharge Medications:  New Prescriptions    No medications on file           Cosmo Burnett MD  04/22/24 [id number]

## (undated) DEVICE — TUBING IRRIG TUR Y TYPE 96" LF 6543-01

## (undated) DEVICE — SOL WATER IRRIG 3000ML BAG 2B7117

## (undated) DEVICE — LINEN TOWEL PACK X5 5464

## (undated) DEVICE — GUIDEWIRE URO STR STIFF .035"X150CM NITINOL 150NSS35

## (undated) DEVICE — LINEN HALF SHEET 5512

## (undated) DEVICE — LINEN FULL SHEET 5511

## (undated) DEVICE — SOL WATER IRRIG 1000ML BOTTLE 2F7114

## (undated) DEVICE — PACK CYSTO CUSTOM RIDGES

## (undated) DEVICE — COVER FOOTSWITCH W/CINCH 20X24" 923267

## (undated) DEVICE — BASKET RETRIEVAL 1.9FRX120CM ESCAPE NTNL 4 WIRE 390-201

## (undated) DEVICE — BAG CLEAR TRASH 1.3M 39X33" P4040C

## (undated) DEVICE — GLOVE PROTEXIS POWDER FREE SMT 7.5  2D72PT75X

## (undated) DEVICE — RAD RX ISOVUE 300 (50ML) 61% IOPAMIDOL CHARGE PER ML

## (undated) DEVICE — FLEXIVA 365 LASER FIBER

## (undated) RX ORDER — NEOSTIGMINE METHYLSULFATE 1 MG/ML
VIAL (ML) INJECTION
Status: DISPENSED
Start: 2020-12-17

## (undated) RX ORDER — GLYCOPYRROLATE 0.2 MG/ML
INJECTION INTRAMUSCULAR; INTRAVENOUS
Status: DISPENSED
Start: 2020-12-17

## (undated) RX ORDER — KETOROLAC TROMETHAMINE 15 MG/ML
INJECTION, SOLUTION INTRAMUSCULAR; INTRAVENOUS
Status: DISPENSED
Start: 2018-05-23

## (undated) RX ORDER — MECLIZINE HCL 12.5 MG 12.5 MG/1
TABLET ORAL
Status: DISPENSED
Start: 2018-05-26

## (undated) RX ORDER — ONDANSETRON 2 MG/ML
INJECTION INTRAMUSCULAR; INTRAVENOUS
Status: DISPENSED
Start: 2020-12-17

## (undated) RX ORDER — FENTANYL CITRATE-0.9 % NACL/PF 10 MCG/ML
PLASTIC BAG, INJECTION (ML) INTRAVENOUS
Status: DISPENSED
Start: 2020-12-17

## (undated) RX ORDER — SODIUM CHLORIDE 9 MG/ML
INJECTION, SOLUTION INTRAVENOUS
Status: DISPENSED
Start: 2018-05-22

## (undated) RX ORDER — FENTANYL CITRATE 50 UG/ML
INJECTION, SOLUTION INTRAMUSCULAR; INTRAVENOUS
Status: DISPENSED
Start: 2020-12-17

## (undated) RX ORDER — KETOROLAC TROMETHAMINE 30 MG/ML
INJECTION, SOLUTION INTRAMUSCULAR; INTRAVENOUS
Status: DISPENSED
Start: 2018-05-22

## (undated) RX ORDER — DEXAMETHASONE SODIUM PHOSPHATE 4 MG/ML
INJECTION, SOLUTION INTRA-ARTICULAR; INTRALESIONAL; INTRAMUSCULAR; INTRAVENOUS; SOFT TISSUE
Status: DISPENSED
Start: 2020-12-17

## (undated) RX ORDER — CEFAZOLIN SODIUM 2 G/100ML
INJECTION, SOLUTION INTRAVENOUS
Status: DISPENSED
Start: 2020-12-17

## (undated) RX ORDER — EPHEDRINE SULFATE 50 MG/ML
INJECTION, SOLUTION INTRAMUSCULAR; INTRAVENOUS; SUBCUTANEOUS
Status: DISPENSED
Start: 2020-12-17

## (undated) RX ORDER — LIDOCAINE HYDROCHLORIDE 10 MG/ML
INJECTION, SOLUTION EPIDURAL; INFILTRATION; INTRACAUDAL; PERINEURAL
Status: DISPENSED
Start: 2020-12-17

## (undated) RX ORDER — PROPOFOL 10 MG/ML
INJECTION, EMULSION INTRAVENOUS
Status: DISPENSED
Start: 2020-12-17

## (undated) RX ORDER — MORPHINE SULFATE 2 MG/ML
INJECTION, SOLUTION INTRAMUSCULAR; INTRAVENOUS
Status: DISPENSED
Start: 2018-05-23

## (undated) RX ORDER — SODIUM CHLORIDE 9 MG/ML
INJECTION, SOLUTION INTRAVENOUS
Status: DISPENSED
Start: 2018-05-23